# Patient Record
Sex: MALE | Race: WHITE | NOT HISPANIC OR LATINO | Employment: FULL TIME | URBAN - METROPOLITAN AREA
[De-identification: names, ages, dates, MRNs, and addresses within clinical notes are randomized per-mention and may not be internally consistent; named-entity substitution may affect disease eponyms.]

---

## 2017-04-06 ENCOUNTER — ALLSCRIPTS OFFICE VISIT (OUTPATIENT)
Dept: OTHER | Facility: OTHER | Age: 47
End: 2017-04-06

## 2017-04-06 DIAGNOSIS — R73.01 IMPAIRED FASTING GLUCOSE: ICD-10-CM

## 2017-04-06 DIAGNOSIS — E03.9 HYPOTHYROIDISM: ICD-10-CM

## 2017-04-06 DIAGNOSIS — Z83.3 FAMILY HISTORY OF DIABETES MELLITUS: ICD-10-CM

## 2017-08-24 ENCOUNTER — GENERIC CONVERSION - ENCOUNTER (OUTPATIENT)
Dept: OTHER | Facility: OTHER | Age: 47
End: 2017-08-24

## 2017-08-24 DIAGNOSIS — E78.5 HYPERLIPIDEMIA: ICD-10-CM

## 2017-12-08 ENCOUNTER — ALLSCRIPTS OFFICE VISIT (OUTPATIENT)
Dept: OTHER | Facility: OTHER | Age: 47
End: 2017-12-08

## 2017-12-08 DIAGNOSIS — E66.01 MORBID (SEVERE) OBESITY DUE TO EXCESS CALORIES (HCC): ICD-10-CM

## 2017-12-08 DIAGNOSIS — R73.01 IMPAIRED FASTING GLUCOSE: ICD-10-CM

## 2017-12-09 NOTE — PROGRESS NOTES
Assessment    1  Morbid obesity with body mass index of 40 0-44 9 in adult (278 01,V85 41) (E66 01,Z68 41)   2  Abnormal fasting glucose (790 29) (R73 01)   3  Benign essential hypertension (401 1) (I10)   4  Hyperlipemia (272 4) (E78 5)   5  Chronic pain syndrome (338 4) (G89 4)    Plan  Abnormal fasting glucose    · (1) COMPREHENSIVE METABOLIC PANEL; Status:Active; Requested for:[id number]; Abnormal fasting glucose, Benign essential hypertension, Chronic pain syndrome, Hyperlipemia    · Continue with our present treatment plan ; Status:Complete;   Done: 46FML6375  Benign essential hypertension    · Quinapril HCl - 20 MG Oral Tablet; take 1 tablet by mouth daily  Dyspepsia    · Esomeprazole Magnesium 40 MG Oral Capsule Delayed Release (NexIUM); TAKE 1CAPSULE Daily  Morbid obesity with body mass index of 40 0-44 9 in adult    · 1 - Jesus Jerome MD, David Grover  (General Surgery) Co-Management  *  Status: Active  Requested for:91Bgy1818  Care Summary provided  : Yes   · (1) LIPID PANEL, FASTING; Status:Active; Requested for:12Wsu6018;     Chief Complaint  Chief Complaint Chronic Condition [place] Distance: Patient is here today for follow up of chronic conditions described in HPI  History of Present Illness  Hyperlipidemia (Follow-Up): The patient states his hyperlipidemia has been stable since the last visit  Comorbid Illnesses: hypertension  He has no significant interval events  Symptoms: The patient is currently asymptomatic  stable muscle pain  Associated symptoms include no focal neurologic deficits-- and-- no memory loss  Medications: the patient is adherent with his medication regimen  -- He denies medication side effects  The patient is due for a lipid panel  Hypertension (Follow-Up): The patient presents for follow-up of essential hypertension  The patient states he has been doing well with his blood pressure control since the last visit  He has no significant interval events     Symptoms: The patient is currently asymptomatic  Associated symptoms include no headache,-- no focal neurologic deficits-- and-- no memory loss  Home monitoring: The patient is not checking blood pressure at home  Medications: the patient is adherent with his medication regimen  -- He denies medication side effects  Disease Management: the patient is doing well with his blood pressure goals  The patient is due for a lipid panel-- and-- a serum creatinine  Chronic Pain (Follow-Up): The patient is being seen for follow-up of chronic neck pain, chronic back pain and myofascial pain syndrome  The patient reports doing well and with slow tper  Comorbid Illnesses: depression  He has had no significant interval events  Associated symptoms: depression,-- anxiety,-- sleep disturbance-- and-- decreased libido  Medications:  the patient is adherent to his medication regimen, but-- he denies medication side effects  Disease management:  the patient is doing well with his goals  Review of Systems  Complete-Male:  Constitutional: recent weight loss, but-- as noted in HPI   ENT: no sore throat  Cardiovascular: no chest pain  Respiratory: shortness of breath during exertion, but-- no shortness of breath  Gastrointestinal: no abdominal pain,-- no nausea-- and-- no diarrhea  Musculoskeletal: arthralgias-- and-- myalgias  Neurological: no headache,-- no numbness-- and-- no dizziness  Psychiatric: anxiety,-- depression-- and-- controlled  Active Problems  1  Abnormal fasting glucose (790 29) (R73 01)   2  Anemia, unspecified anemia type   3  Benign essential hypertension (401 1) (I10)   4  Chronic pain syndrome (338 4) (G89 4)   5  Degenerative arthropathy (715 90) (M19 90)   6  Depression with anxiety (300 4) (F41 8)   7  Dyspepsia (536 8) (R10 13)   8  Encounter for blood test (V72 60) (Z01 89)   9  Encounter for chronic pain management (V65 49) (G89 29)   10  Encounter to establish care with new doctor (V65 8) (Z76 89)   11   Erectile dysfunction (607 84) (N52 9)   12  Flu vaccine need (V04 81) (Z23)   13  Hyperlipemia (272 4) (E78 5)   14  Hypothyroidism (244 9) (E03 9)   15  Impingement syndrome of left shoulder (726 2) (M75 42)   16  Low testosterone level in male (257 2) (E29 1)   17  Morbid obesity with body mass index of 40 0-44 9 in adult (278 01,V85 41) (E66 01,Z68 41)   18  Neck pain (723 1) (M54 2)   19  Osteoarthritis (715 90) (M19 90)   20  Pain management contract signed (N40 48) (Z02 89)   21  Preop exam for internal medicine (V72 83) (Z01 818)   22  Primary hypothyroidism (244 9) (E03 9)   23  Prostate cancer screening (V76 44) (Z12 5)   24  Syndrome X, metabolic (181 5) (D84 26)   25  Vitamin D deficiency (268 9) (E55 9)    Past Medical History  1  History of Benign essential hypertension (401 1) (I10)  Active Problems And Past Medical History Reviewed: The active problems and past medical history were reviewed and updated today  Surgical History  1  History of Knee Replacement   2  History of Shoulder Surgery   3  History of Total Disc Arthroplasty  Surgical History Reviewed: The surgical history was reviewed and updated today  Family History  Father    1  Family history of diabetes mellitus (V18 0) (Z83 3)  Family History Reviewed: The family history was reviewed and updated today  Social History     ·    · Never a smoker   · No alcohol use   · Non-smoker (V49 89) (Z78 9)   · Two children  Social History Reviewed: The social history was reviewed and updated today  Current Meds   1  ALPRAZolam 0 5 MG Oral Tablet; TAKE 1 TABLET TWICE DAILY AS DIRECTED; Last Rx:02Nov2017 Ordered   2  Citalopram Hydrobromide 20 MG Oral Tablet; TAKE 1 TABLET DAILY; Therapy: 19CUM3130 to (Barron Lua)  Requested for: 38NLZ1309; Last Rx:49Ogq0512 Ordered   3  Crestor 10 MG Oral Tablet; TAKE 1 TABLET DAILY; Therapy: 68Syd3541 to (Evaluate:23Woo5901)  Requested for: 92Bau2869; Last Rx:99Fub9563 Ordered   4  FentaNYL 50 MCG/HR Transdermal Patch 72 Hour; 1 patch every 72hrs; Therapy: 32HUY3410 to (Evaluate:56Hdj3938); Last Rx:30Nov2017 Ordered   5  FentaNYL 50 MCG/HR Transdermal Patch 72 Hour; APPLY 1 PATCH EVERY 3 DAYS; Therapy: 34GEH4867 to (Evaluate:04Hne6953); Last Rx:30Nov2017 Ordered   6  Lansoprazole 30 MG Oral Capsule Delayed Release; take 1 capsule daily; Therapy: 46IVO5025 to (Last Jacintoladonna Tavera)  Requested for: 18Ajw6900 Ordered   7  Levothyroxine Sodium 75 MCG Oral Tablet; TAKE 1 TABLET DAILY  Requested for: 78PYV3299; Last Rx:09Fku0224 Ordered   8  MetFORMIN HCl - 500 MG Oral Tablet; take 1 tablet by mouth twice a day with food; Therapy: 27BCO6109 to (Evaluate:20Jan2018)  Requested for: 23Uoa8483; Last Rx:53Yhf2894 Ordered   9  OxyCODONE HCl - 20 MG Oral Tablet; one  or two pill PO q 4 hr prn; Last Rx:20Nov2017 Ordered   10  Prevacid 30 MG Oral Capsule Delayed Release; Therapy: (Trude Lama) to Recorded   11  Quinapril HCl - 20 MG Oral Tablet; take 1 tablet by mouth daily; Therapy: 45LZI8680 to [phone])  Requested for: 05PUZ7114; Last Rx:22Ovx3393  Ordered   12  Vitamin D3 53609 UNIT Oral Capsule; TAKE ONE CAPSULE BY MOUTH WEEKLY FOR 2 MONTHS; Therapy: 37JPX5727 to [phone])  Requested for: 73Asf7978; Last Rx:56Exo1305  Ordered  Medication List Reviewed: The medication list was reviewed and updated today  Allergies  1  No Known Drug Allergies    Vitals  Vital Signs    Recorded: 04NLE1546 09:56AM   Heart Rate 104   Height 5 ft 9 in   Weight 338 lb    BMI Calculated 49 91   BSA Calculated 2 58   O2 Saturation 94       Physical Exam   Constitutional  General appearance: Abnormal  -- morbid obese  Ears, Nose, Mouth, and Throat  Oropharynx: Abnormal  -- Crowded pharynx  Pulmonary  Respiratory effort: No increased work of breathing or signs of respiratory distress     Auscultation of lungs: Clear to auscultation, equal breath sounds bilaterally, no wheezes, no rales, no rhonci  Cardiovascular  Auscultation of heart: Normal rate and rhythm, normal S1 and S2, without murmurs  Examination of extremities for edema and/or varicosities: Normal    Carotid pulses: Normal    Abdomen  Abdomen: Abnormal  -- obese, NT, BS+  Lymphatic  Palpation of lymph nodes in neck: No lymphadenopathy  Musculoskeletal  Gait and station: Normal    Neurologic  Cranial nerves: Cranial nerves 2-12 intact     Psychiatric  Orientation to person, place and time: Normal    Mood and affect: Normal          Signatures   Electronically signed by : EDWAR Vogt ; Dec  8 2017  5:18PM EST                       (Author)

## 2018-01-13 VITALS
HEIGHT: 69 IN | DIASTOLIC BLOOD PRESSURE: 84 MMHG | OXYGEN SATURATION: 96 % | SYSTOLIC BLOOD PRESSURE: 112 MMHG | HEART RATE: 96 BPM | BODY MASS INDEX: 46.65 KG/M2 | WEIGHT: 315 LBS

## 2018-01-22 VITALS
OXYGEN SATURATION: 94 % | HEIGHT: 69 IN | HEART RATE: 108 BPM | BODY MASS INDEX: 46.65 KG/M2 | DIASTOLIC BLOOD PRESSURE: 82 MMHG | WEIGHT: 315 LBS | SYSTOLIC BLOOD PRESSURE: 132 MMHG

## 2018-01-22 VITALS — WEIGHT: 315 LBS | HEART RATE: 104 BPM | OXYGEN SATURATION: 94 % | BODY MASS INDEX: 46.65 KG/M2 | HEIGHT: 69 IN

## 2018-02-12 DIAGNOSIS — R52 PAIN: Primary | ICD-10-CM

## 2018-02-12 RX ORDER — FENTANYL 50 UG/H
1 PATCH TRANSDERMAL
Qty: 15 PATCH | Refills: 0 | Status: SHIPPED | OUTPATIENT
Start: 2018-02-12 | End: 2018-03-08 | Stop reason: SDUPTHER

## 2018-02-12 RX ORDER — FENTANYL 50 UG/H
1 PATCH TRANSDERMAL
Qty: 1 PATCH | Refills: 0 | Status: SHIPPED | OUTPATIENT
Start: 2018-02-12 | End: 2018-03-08 | Stop reason: SDUPTHER

## 2018-02-12 RX ORDER — OXYCODONE HYDROCHLORIDE 20 MG/1
20 TABLET ORAL EVERY 4 HOURS PRN
Qty: 360 TABLET | Refills: 0 | Status: SHIPPED | OUTPATIENT
Start: 2018-02-12 | End: 2018-03-08 | Stop reason: SDUPTHER

## 2018-02-12 RX ORDER — ALPRAZOLAM 0.5 MG/1
1 TABLET ORAL 2 TIMES DAILY
COMMUNITY
End: 2018-02-12 | Stop reason: SDUPTHER

## 2018-02-12 RX ORDER — OXYCODONE HYDROCHLORIDE 20 MG/1
TABLET ORAL
COMMUNITY
End: 2018-02-12 | Stop reason: SDUPTHER

## 2018-02-12 RX ORDER — FENTANYL 50 UG/H
PATCH TRANSDERMAL
COMMUNITY
Start: 2016-11-04 | End: 2018-02-12 | Stop reason: SDUPTHER

## 2018-02-12 RX ORDER — ALPRAZOLAM 0.5 MG/1
0.5 TABLET ORAL 2 TIMES DAILY
Qty: 180 TABLET | Refills: 0 | Status: SHIPPED | OUTPATIENT
Start: 2018-02-12 | End: 2018-07-18 | Stop reason: SDUPTHER

## 2018-02-12 RX ORDER — FENTANYL 50 UG/H
1 PATCH TRANSDERMAL
COMMUNITY
Start: 2016-11-04 | End: 2018-02-12 | Stop reason: SDUPTHER

## 2018-03-08 DIAGNOSIS — R52 PAIN: ICD-10-CM

## 2018-03-08 RX ORDER — OXYCODONE HYDROCHLORIDE 20 MG/1
20 TABLET ORAL EVERY 4 HOURS PRN
Qty: 240 TABLET | Refills: 0 | Status: SHIPPED | OUTPATIENT
Start: 2018-03-08 | End: 2018-04-02 | Stop reason: SDUPTHER

## 2018-03-08 RX ORDER — OXYCODONE HYDROCHLORIDE 20 MG/1
20 TABLET ORAL EVERY 4 HOURS PRN
Qty: 120 TABLET | Refills: 0 | Status: SHIPPED | OUTPATIENT
Start: 2018-03-08 | End: 2018-04-02 | Stop reason: SDUPTHER

## 2018-03-08 RX ORDER — FENTANYL 50 UG/H
1 PATCH TRANSDERMAL
Qty: 1 PATCH | Refills: 0 | Status: SHIPPED | OUTPATIENT
Start: 2018-03-08 | End: 2018-04-19 | Stop reason: SDUPTHER

## 2018-03-08 RX ORDER — FENTANYL 50 UG/H
1 PATCH TRANSDERMAL
Qty: 15 PATCH | Refills: 0 | Status: SHIPPED | OUTPATIENT
Start: 2018-03-08 | End: 2018-04-19 | Stop reason: SDUPTHER

## 2018-03-08 NOTE — TELEPHONE ENCOUNTER
Patients insurance will only pay for 240 tablets of oxycodone a month so he needs to pay for 120 tablets out of pocket  This is why we need two separate scripts  The two scripts will total 360 which still amounts to the same amount patient takes monthly

## 2018-04-02 DIAGNOSIS — R52 PAIN: ICD-10-CM

## 2018-04-03 RX ORDER — OXYCODONE HYDROCHLORIDE 20 MG/1
20 TABLET ORAL EVERY 4 HOURS PRN
Qty: 120 TABLET | Refills: 0 | Status: SHIPPED | OUTPATIENT
Start: 2018-04-03 | End: 2018-04-09 | Stop reason: SDUPTHER

## 2018-04-03 RX ORDER — OXYCODONE HYDROCHLORIDE 20 MG/1
20 TABLET ORAL EVERY 4 HOURS PRN
Qty: 240 TABLET | Refills: 0 | Status: SHIPPED | OUTPATIENT
Start: 2018-04-03 | End: 2018-05-01 | Stop reason: SDUPTHER

## 2018-04-09 DIAGNOSIS — R52 PAIN: ICD-10-CM

## 2018-04-09 RX ORDER — OXYCODONE HYDROCHLORIDE 20 MG/1
TABLET ORAL
Qty: 120 TABLET | Refills: 0 | Status: SHIPPED | OUTPATIENT
Start: 2018-04-09 | End: 2018-05-01 | Stop reason: SDUPTHER

## 2018-04-09 NOTE — TELEPHONE ENCOUNTER
The pharmacy is requesting the script to change to 1 to 2 tablets every 4hrs for the 120 tab request of oxycodone  Pt will return the original script before we hand him the new script

## 2018-04-14 DIAGNOSIS — F32.A DEPRESSION, UNSPECIFIED DEPRESSION TYPE: Primary | ICD-10-CM

## 2018-04-16 RX ORDER — CITALOPRAM 20 MG/1
TABLET ORAL
Qty: 90 TABLET | Refills: 0 | Status: SHIPPED | OUTPATIENT
Start: 2018-04-16 | End: 2018-07-21 | Stop reason: SDUPTHER

## 2018-04-19 DIAGNOSIS — R52 PAIN: ICD-10-CM

## 2018-04-19 RX ORDER — FENTANYL 50 UG/H
1 PATCH TRANSDERMAL
Qty: 15 PATCH | Refills: 0 | Status: SHIPPED | OUTPATIENT
Start: 2018-04-19 | End: 2018-05-17 | Stop reason: SDUPTHER

## 2018-04-19 RX ORDER — FENTANYL 50 UG/H
1 PATCH TRANSDERMAL
Qty: 1 PATCH | Refills: 0 | Status: SHIPPED | OUTPATIENT
Start: 2018-04-19 | End: 2018-05-17 | Stop reason: SDUPTHER

## 2018-05-01 DIAGNOSIS — R52 PAIN: ICD-10-CM

## 2018-05-01 DIAGNOSIS — I15.9 SECONDARY HYPERTENSION: Primary | ICD-10-CM

## 2018-05-01 RX ORDER — QUINAPRIL 20 MG/1
20 TABLET ORAL DAILY
Qty: 90 TABLET | Refills: 1 | Status: SHIPPED | OUTPATIENT
Start: 2018-05-01 | End: 2018-10-21 | Stop reason: SDUPTHER

## 2018-05-01 RX ORDER — QUINAPRIL 20 MG/1
1 TABLET ORAL DAILY
COMMUNITY
Start: 2017-04-06 | End: 2018-05-01 | Stop reason: SDUPTHER

## 2018-05-01 RX ORDER — OXYCODONE HYDROCHLORIDE 20 MG/1
20 TABLET ORAL EVERY 4 HOURS PRN
Qty: 240 TABLET | Refills: 0 | Status: SHIPPED | OUTPATIENT
Start: 2018-05-01 | End: 2018-05-30 | Stop reason: SDUPTHER

## 2018-05-01 RX ORDER — OXYCODONE HYDROCHLORIDE 20 MG/1
TABLET ORAL
Qty: 120 TABLET | Refills: 0 | Status: SHIPPED | OUTPATIENT
Start: 2018-05-01 | End: 2018-05-30 | Stop reason: SDUPTHER

## 2018-05-17 DIAGNOSIS — R52 PAIN: ICD-10-CM

## 2018-05-17 RX ORDER — FENTANYL 50 UG/H
1 PATCH TRANSDERMAL
Qty: 1 PATCH | Refills: 0 | Status: SHIPPED | OUTPATIENT
Start: 2018-05-17 | End: 2018-06-18 | Stop reason: SDUPTHER

## 2018-05-17 RX ORDER — FENTANYL 50 UG/H
1 PATCH TRANSDERMAL
Qty: 15 PATCH | Refills: 0 | Status: SHIPPED | OUTPATIENT
Start: 2018-05-17 | End: 2018-06-18 | Stop reason: SDUPTHER

## 2018-05-30 DIAGNOSIS — R52 PAIN: ICD-10-CM

## 2018-05-30 RX ORDER — OXYCODONE HYDROCHLORIDE 20 MG/1
TABLET ORAL
Qty: 120 TABLET | Refills: 0 | Status: SHIPPED | OUTPATIENT
Start: 2018-05-30 | End: 2018-06-25 | Stop reason: SDUPTHER

## 2018-05-30 RX ORDER — OXYCODONE HYDROCHLORIDE 20 MG/1
20 TABLET ORAL EVERY 4 HOURS PRN
Qty: 240 TABLET | Refills: 0 | Status: SHIPPED | OUTPATIENT
Start: 2018-05-30 | End: 2018-06-25 | Stop reason: SDUPTHER

## 2018-06-18 DIAGNOSIS — R52 PAIN: ICD-10-CM

## 2018-06-18 RX ORDER — FENTANYL 50 UG/H
1 PATCH TRANSDERMAL
Qty: 1 PATCH | Refills: 0 | Status: SHIPPED | OUTPATIENT
Start: 2018-06-18 | End: 2018-07-16 | Stop reason: SDUPTHER

## 2018-06-18 RX ORDER — FENTANYL 50 UG/H
1 PATCH TRANSDERMAL
Qty: 15 PATCH | Refills: 0 | Status: SHIPPED | OUTPATIENT
Start: 2018-06-18 | End: 2018-07-16 | Stop reason: SDUPTHER

## 2018-06-24 DIAGNOSIS — E03.9 HYPOTHYROIDISM, UNSPECIFIED TYPE: Primary | ICD-10-CM

## 2018-06-25 DIAGNOSIS — R52 PAIN: ICD-10-CM

## 2018-06-25 RX ORDER — OXYCODONE HYDROCHLORIDE 20 MG/1
20 TABLET ORAL EVERY 4 HOURS PRN
Qty: 240 TABLET | Refills: 0 | Status: SHIPPED | OUTPATIENT
Start: 2018-06-25 | End: 2018-07-25 | Stop reason: SDUPTHER

## 2018-06-25 RX ORDER — LEVOTHYROXINE SODIUM 0.07 MG/1
TABLET ORAL
Qty: 90 TABLET | Refills: 3 | Status: SHIPPED | OUTPATIENT
Start: 2018-06-25 | End: 2018-08-23 | Stop reason: SDUPTHER

## 2018-06-25 RX ORDER — OXYCODONE HYDROCHLORIDE 20 MG/1
TABLET ORAL
Qty: 120 TABLET | Refills: 0 | Status: SHIPPED | OUTPATIENT
Start: 2018-06-25 | End: 2018-07-25 | Stop reason: SDUPTHER

## 2018-06-29 ENCOUNTER — TELEPHONE (OUTPATIENT)
Dept: INTERNAL MEDICINE CLINIC | Facility: CLINIC | Age: 48
End: 2018-06-29

## 2018-06-29 NOTE — TELEPHONE ENCOUNTER
Sarabjit Barbour from [facility] called with questions on an oxycodone script that was sent to the pharmacy  Please call him back at [phone]

## 2018-07-02 NOTE — TELEPHONE ENCOUNTER
Spoke to the pharmacist M[facility] at [facility] he stated that patient had two oxycodone scripts one that states that he should not be taking more than six a day, but patient states that he takes 12 tablets daily  Patient could not fill the script with the limit on it so he filled his other script that had no limit on it  Please advise what patient should be taking daily   Thanks

## 2018-07-16 ENCOUNTER — TELEPHONE (OUTPATIENT)
Dept: INTERNAL MEDICINE CLINIC | Facility: CLINIC | Age: 48
End: 2018-07-16

## 2018-07-16 DIAGNOSIS — R52 PAIN: ICD-10-CM

## 2018-07-16 RX ORDER — FENTANYL 50 UG/H
1 PATCH TRANSDERMAL
Qty: 1 PATCH | Refills: 0 | Status: SHIPPED | OUTPATIENT
Start: 2018-07-16 | End: 2018-08-09 | Stop reason: SDUPTHER

## 2018-07-16 RX ORDER — FENTANYL 50 UG/H
1 PATCH TRANSDERMAL
Qty: 10 PATCH | Refills: 0 | Status: SHIPPED | OUTPATIENT
Start: 2018-07-16 | End: 2018-08-09 | Stop reason: SDUPTHER

## 2018-07-16 NOTE — TELEPHONE ENCOUNTER
Patient left message on the nurses line stating that he needs refills on his Fentanyl patches  He needs one single script and another one for 10 patches every two days

## 2018-07-16 NOTE — TELEPHONE ENCOUNTER
Danni Garcia I printed it the way he was taking it  I reviewed the drug database (we have to do this for everyone so if someone calls, let's look and see when it was last filled)   Last filled 6/22/18

## 2018-07-18 DIAGNOSIS — R52 PAIN: ICD-10-CM

## 2018-07-20 DIAGNOSIS — R52 PAIN: ICD-10-CM

## 2018-07-21 DIAGNOSIS — F32.A DEPRESSION, UNSPECIFIED DEPRESSION TYPE: ICD-10-CM

## 2018-07-23 RX ORDER — CITALOPRAM 20 MG/1
TABLET ORAL
Qty: 90 TABLET | Refills: 0 | Status: SHIPPED | OUTPATIENT
Start: 2018-07-23 | End: 2018-10-22 | Stop reason: SDUPTHER

## 2018-07-24 ENCOUNTER — TELEPHONE (OUTPATIENT)
Dept: INTERNAL MEDICINE CLINIC | Facility: CLINIC | Age: 48
End: 2018-07-24

## 2018-07-24 NOTE — TELEPHONE ENCOUNTER
Patient called stating that he needs the following scripts for   -Oxycodone 20 mg take one or two tablets every four hours w/qty 240  -Oxycodone 20 mg take one or two tablets every four hours with a max of 12 tablets a day qty 120  -xanax 0 5 mg

## 2018-07-25 DIAGNOSIS — R52 PAIN: ICD-10-CM

## 2018-07-25 RX ORDER — ALPRAZOLAM 0.5 MG/1
0.5 TABLET ORAL 2 TIMES DAILY
Qty: 180 TABLET | Refills: 0 | OUTPATIENT
Start: 2018-07-25 | End: 2018-08-09 | Stop reason: SDUPTHER

## 2018-07-25 RX ORDER — OXYCODONE HYDROCHLORIDE 20 MG/1
20 TABLET ORAL EVERY 4 HOURS PRN
Qty: 240 TABLET | Refills: 0 | Status: SHIPPED | OUTPATIENT
Start: 2018-07-25 | End: 2018-08-23 | Stop reason: SDUPTHER

## 2018-07-25 RX ORDER — ALPRAZOLAM 0.5 MG/1
TABLET ORAL
Qty: 180 TABLET | Refills: 0 | Status: SHIPPED | OUTPATIENT
Start: 2018-07-25 | End: 2018-08-09 | Stop reason: SDUPTHER

## 2018-08-09 ENCOUNTER — APPOINTMENT (OUTPATIENT)
Dept: LAB | Facility: CLINIC | Age: 48
End: 2018-08-09
Payer: COMMERCIAL

## 2018-08-09 ENCOUNTER — OFFICE VISIT (OUTPATIENT)
Dept: INTERNAL MEDICINE CLINIC | Facility: CLINIC | Age: 48
End: 2018-08-09
Payer: COMMERCIAL

## 2018-08-09 VITALS
BODY MASS INDEX: 46.65 KG/M2 | HEIGHT: 69 IN | DIASTOLIC BLOOD PRESSURE: 80 MMHG | WEIGHT: 315 LBS | OXYGEN SATURATION: 92 % | SYSTOLIC BLOOD PRESSURE: 130 MMHG | HEART RATE: 110 BPM | RESPIRATION RATE: 20 BRPM

## 2018-08-09 DIAGNOSIS — M15.8 OTHER OSTEOARTHRITIS INVOLVING MULTIPLE JOINTS: ICD-10-CM

## 2018-08-09 DIAGNOSIS — Z79.4 TYPE 2 DIABETES MELLITUS WITHOUT COMPLICATION, WITH LONG-TERM CURRENT USE OF INSULIN (HCC): Primary | ICD-10-CM

## 2018-08-09 DIAGNOSIS — E66.01 MORBID OBESITY WITH BMI OF 50.0-59.9, ADULT (HCC): Primary | ICD-10-CM

## 2018-08-09 DIAGNOSIS — I10 BENIGN ESSENTIAL HYPERTENSION: ICD-10-CM

## 2018-08-09 DIAGNOSIS — G89.4 CHRONIC PAIN SYNDROME: ICD-10-CM

## 2018-08-09 DIAGNOSIS — E11.9 TYPE 2 DIABETES MELLITUS WITHOUT COMPLICATION, WITH LONG-TERM CURRENT USE OF INSULIN (HCC): Primary | ICD-10-CM

## 2018-08-09 DIAGNOSIS — E11.9 TYPE 2 DIABETES MELLITUS WITHOUT COMPLICATION, WITHOUT LONG-TERM CURRENT USE OF INSULIN (HCC): ICD-10-CM

## 2018-08-09 DIAGNOSIS — R52 PAIN: ICD-10-CM

## 2018-08-09 PROBLEM — E78.5 HYPERLIPEMIA: Status: ACTIVE | Noted: 2017-08-24

## 2018-08-09 PROBLEM — E55.9 VITAMIN D DEFICIENCY: Status: ACTIVE | Noted: 2017-04-06

## 2018-08-09 LAB
25(OH)D3 SERPL-MCNC: 12.8 NG/ML (ref 30–100)
ALBUMIN SERPL BCP-MCNC: 3.7 G/DL (ref 3.5–5)
ALP SERPL-CCNC: 97 U/L (ref 46–116)
ALT SERPL W P-5'-P-CCNC: 34 U/L (ref 12–78)
ANION GAP SERPL CALCULATED.3IONS-SCNC: 7 MMOL/L (ref 4–13)
AST SERPL W P-5'-P-CCNC: 14 U/L (ref 5–45)
BASOPHILS # BLD AUTO: 0.05 THOUSANDS/ΜL (ref 0–0.1)
BASOPHILS NFR BLD AUTO: 1 % (ref 0–1)
BILIRUB SERPL-MCNC: 0.46 MG/DL (ref 0.2–1)
BUN SERPL-MCNC: 14 MG/DL (ref 5–25)
CALCIUM SERPL-MCNC: 8.9 MG/DL (ref 8.3–10.1)
CHLORIDE SERPL-SCNC: 95 MMOL/L (ref 100–108)
CHOLEST SERPL-MCNC: 232 MG/DL (ref 50–200)
CO2 SERPL-SCNC: 28 MMOL/L (ref 21–32)
CREAT SERPL-MCNC: 1.4 MG/DL (ref 0.6–1.3)
CREAT UR-MCNC: 67.4 MG/DL
EOSINOPHIL # BLD AUTO: 0.17 THOUSAND/ΜL (ref 0–0.61)
EOSINOPHIL NFR BLD AUTO: 2 % (ref 0–6)
ERYTHROCYTE [DISTWIDTH] IN BLOOD BY AUTOMATED COUNT: 12.8 % (ref 11.6–15.1)
EST. AVERAGE GLUCOSE BLD GHB EST-MCNC: 240 MG/DL
GFR SERPL CREATININE-BSD FRML MDRD: 59 ML/MIN/1.73SQ M
GLUCOSE P FAST SERPL-MCNC: 597 MG/DL (ref 65–99)
HBA1C MFR BLD: 10 % (ref 4.2–6.3)
HCT VFR BLD AUTO: 42.6 % (ref 36.5–49.3)
HDLC SERPL-MCNC: 30 MG/DL (ref 40–60)
HGB BLD-MCNC: 14.1 G/DL (ref 12–17)
IMM GRANULOCYTES # BLD AUTO: 0.09 THOUSAND/UL (ref 0–0.2)
IMM GRANULOCYTES NFR BLD AUTO: 1 % (ref 0–2)
LYMPHOCYTES # BLD AUTO: 1.76 THOUSANDS/ΜL (ref 0.6–4.47)
LYMPHOCYTES NFR BLD AUTO: 19 % (ref 14–44)
MCH RBC QN AUTO: 32 PG (ref 26.8–34.3)
MCHC RBC AUTO-ENTMCNC: 33.1 G/DL (ref 31.4–37.4)
MCV RBC AUTO: 97 FL (ref 82–98)
MICROALBUMIN UR-MCNC: 32.6 MG/L (ref 0–20)
MICROALBUMIN/CREAT 24H UR: 48 MG/G CREATININE (ref 0–30)
MONOCYTES # BLD AUTO: 0.61 THOUSAND/ΜL (ref 0.17–1.22)
MONOCYTES NFR BLD AUTO: 7 % (ref 4–12)
NEUTROPHILS # BLD AUTO: 6.54 THOUSANDS/ΜL (ref 1.85–7.62)
NEUTS SEG NFR BLD AUTO: 70 % (ref 43–75)
NONHDLC SERPL-MCNC: 202 MG/DL
NRBC BLD AUTO-RTO: 0 /100 WBCS
PLATELET # BLD AUTO: 265 THOUSANDS/UL (ref 149–390)
PMV BLD AUTO: 11.4 FL (ref 8.9–12.7)
POTASSIUM SERPL-SCNC: 3.9 MMOL/L (ref 3.5–5.3)
PROT SERPL-MCNC: 7.8 G/DL (ref 6.4–8.2)
RBC # BLD AUTO: 4.41 MILLION/UL (ref 3.88–5.62)
SL AMB POCT GLUCOSE BLD: 530
SODIUM SERPL-SCNC: 130 MMOL/L (ref 136–145)
TRIGL SERPL-MCNC: 636 MG/DL
TSH SERPL DL<=0.05 MIU/L-ACNC: 1.62 UIU/ML (ref 0.36–3.74)
WBC # BLD AUTO: 9.22 THOUSAND/UL (ref 4.31–10.16)

## 2018-08-09 PROCEDURE — 3075F SYST BP GE 130 - 139MM HG: CPT | Performed by: INTERNAL MEDICINE

## 2018-08-09 PROCEDURE — 80061 LIPID PANEL: CPT | Performed by: INTERNAL MEDICINE

## 2018-08-09 PROCEDURE — 83036 HEMOGLOBIN GLYCOSYLATED A1C: CPT | Performed by: INTERNAL MEDICINE

## 2018-08-09 PROCEDURE — 82043 UR ALBUMIN QUANTITATIVE: CPT | Performed by: INTERNAL MEDICINE

## 2018-08-09 PROCEDURE — 84443 ASSAY THYROID STIM HORMONE: CPT | Performed by: INTERNAL MEDICINE

## 2018-08-09 PROCEDURE — 82570 ASSAY OF URINE CREATININE: CPT | Performed by: INTERNAL MEDICINE

## 2018-08-09 PROCEDURE — 3079F DIAST BP 80-89 MM HG: CPT | Performed by: INTERNAL MEDICINE

## 2018-08-09 PROCEDURE — 3060F POS MICROALBUMINURIA REV: CPT | Performed by: INTERNAL MEDICINE

## 2018-08-09 PROCEDURE — 85025 COMPLETE CBC W/AUTO DIFF WBC: CPT | Performed by: INTERNAL MEDICINE

## 2018-08-09 PROCEDURE — 82306 VITAMIN D 25 HYDROXY: CPT | Performed by: INTERNAL MEDICINE

## 2018-08-09 PROCEDURE — 36415 COLL VENOUS BLD VENIPUNCTURE: CPT | Performed by: INTERNAL MEDICINE

## 2018-08-09 PROCEDURE — 80053 COMPREHEN METABOLIC PANEL: CPT | Performed by: INTERNAL MEDICINE

## 2018-08-09 PROCEDURE — 99215 OFFICE O/P EST HI 40 MIN: CPT | Performed by: INTERNAL MEDICINE

## 2018-08-09 PROCEDURE — 82948 REAGENT STRIP/BLOOD GLUCOSE: CPT | Performed by: INTERNAL MEDICINE

## 2018-08-09 RX ORDER — ALPRAZOLAM 0.5 MG/1
0.5 TABLET ORAL 2 TIMES DAILY
Qty: 180 TABLET | Refills: 0 | OUTPATIENT
Start: 2018-08-09 | End: 2018-08-09 | Stop reason: SDUPTHER

## 2018-08-09 RX ORDER — ROSUVASTATIN CALCIUM 10 MG/1
10 TABLET, COATED ORAL DAILY
Refills: 3 | COMMUNITY
Start: 2018-06-21 | End: 2018-09-16 | Stop reason: SDUPTHER

## 2018-08-09 RX ORDER — ESOMEPRAZOLE MAGNESIUM 40 MG/1
40 CAPSULE, DELAYED RELEASE ORAL DAILY
Refills: 3 | COMMUNITY
Start: 2018-06-21 | End: 2018-11-26 | Stop reason: ALTCHOICE

## 2018-08-09 RX ORDER — FENTANYL 50 UG/H
1 PATCH TRANSDERMAL
Qty: 1 PATCH | Refills: 0 | Status: SHIPPED | OUTPATIENT
Start: 2018-08-09 | End: 2018-09-10 | Stop reason: SDUPTHER

## 2018-08-09 RX ORDER — FENTANYL 50 UG/H
1 PATCH TRANSDERMAL
Qty: 10 PATCH | Refills: 0 | Status: SHIPPED | OUTPATIENT
Start: 2018-08-09 | End: 2018-09-10 | Stop reason: SDUPTHER

## 2018-08-09 RX ORDER — ALPRAZOLAM 0.5 MG/1
0.5 TABLET ORAL 2 TIMES DAILY
Qty: 180 TABLET | Refills: 0 | Status: SHIPPED | OUTPATIENT
Start: 2018-08-09 | End: 2018-11-05 | Stop reason: SDUPTHER

## 2018-08-09 RX ORDER — LANSOPRAZOLE 30 MG/1
1 CAPSULE, DELAYED RELEASE ORAL DAILY
COMMUNITY
Start: 2016-11-16 | End: 2018-08-09 | Stop reason: ALTCHOICE

## 2018-08-09 NOTE — PROGRESS NOTES
Assessment/Plan:         Diagnoses and all orders for this visit:    Morbid obesity with BMI of 50 0-59 9, adult [facility])  -     Ambulatory referral to Bariatric Surgery; Future  -     Comprehensive metabolic panel  -     CBC and differential  -     TSH, 3rd generation with Free T4 reflex  -     Lipid panel  -     Vitamin D 25 hydroxy    Chronic pain syndrome  -     Ambulatory referral to Pain Management; Future  -     fentaNYL (DURAGESIC) 50 mcg/hr; Place 1 patch on the skin every third day Max Daily Amount: 1 patch  -     Pain Management Profile 1 W/ Confirmation, Urine    Type 2 diabetes mellitus without complication, without long-term current use of insulin ([facility])  -     Hemoglobin A1C  -     Microalbumin / creatinine urine ratio  -     POCT blood glucose  -     insulin glargine (BASAGLAR KWIKPEN) 100 units/mL injection pen; Inject 10 Units under the skin daily  His blood sugar in the office was 500  Patient would increase his fluid intake  Close follow-up as above Lab studies ordered above  Pain  -     fentaNYL (DURAGESIC) 50 mcg/hr; Place 1 patch on the skin every third day Max Daily Amount: 1 patch  -     Discontinue: ALPRAZolam (XANAX) 0 5 mg tablet; Take 1 tablet (0 5 mg total) by mouth 2 (two) times a day  -     Discontinue: ALPRAZolam (XANAX) 0 5 mg tablet; Take 1 tablet (0 5 mg total) by mouth 2 (two) times a day  -     Discontinue: ALPRAZolam (XANAX) 0 5 mg tablet; Take 1 tablet (0 5 mg total) by mouth 2 (two) times a day  -     ALPRAZolam (XANAX) 0 5 mg tablet; Take 1 tablet (0 5 mg total) by mouth 2 (two) times a day  -     Pain Management Profile 1 W/ Confirmation, Urine    Other osteoarthritis involving multiple joints  See discussion above  Benign essential hypertension  Control continue current regimen  Other orders  -     esomeprazole (NexIUM) 40 MG capsule; Take 40 mg by mouth daily  -     Discontinue: lansoprazole (PREVACID) 30 mg capsule;  Take 1 capsule by mouth daily  -     metFORMIN (GLUCOPHAGE) 500 mg tablet; Take 1 tablet by mouth Twice daily  -     rosuvastatin (CRESTOR) 10 MG tablet; Take 10 mg by mouth daily        Subjective:      Patient ID: María Elena Spencer is a 49 y o  male  Patient is here to follow-up on chronic medical problems and current narcotic use  We have discussed cutting back on the medication  Patient reports having ongoing arthralgias and myalgias  Patient has several joint replacements  He had seen a rheumatologist in the past   No other explanation of his arthralgias were found  We discussed revisit in but also following up with pain management  Urine tox was collected from the office today  He also reports his blood sugars running high  Fortunately has been only taking metformin 500 mg once a day  See ROS below  We discussed treatment options  I advised him to take metformin 500 twice a day  Start him on glargine 10 units a day  I would follow up with him in 2 weeks  Hopefully be able to use oral hypoglycemic medications have better glycemic profile  Patient reports no shortness of breath lightheadedness palpitation  He has not gone for lab studies ordered last time  Lab studies ordered today  Bariatric surgery was also discussed  The following portions of the patient's history were reviewed and updated as appropriate: allergies, current medications, past family history, past medical history, past social history, past surgical history and problem list     Review of Systems   Constitutional: Negative for chills and fever  HENT: Positive for congestion and postnasal drip  Negative for trouble swallowing  Eyes: Positive for visual disturbance  Respiratory: Negative for cough and shortness of breath  Cardiovascular: Negative for chest pain and leg swelling  Gastrointestinal: Negative for abdominal pain, blood in stool, constipation, diarrhea and nausea  Endocrine: Negative for polydipsia and polyphagia          Blood sugar at home has been running high   Genitourinary: Positive for frequency  Negative for dysuria  Musculoskeletal: Positive for arthralgias, back pain and myalgias  Neurological: Negative for dizziness and light-headedness  Psychiatric/Behavioral: Negative for dysphoric mood  Current Outpatient Prescriptions:     ALPRAZolam (XANAX) 0 5 mg tablet, Take 1 tablet (0 5 mg total) by mouth 2 (two) times a day, Disp: 180 tablet, Rfl: 0    citalopram (CeleXA) 20 mg tablet, TAKE 1 TABLET BY MOUTH EVERY DAY, Disp: 90 tablet, Rfl: 0    esomeprazole (NexIUM) 40 MG capsule, Take 40 mg by mouth daily, Disp: , Rfl: 3    fentaNYL (DURAGESIC) 50 mcg/hr, Place 1 patch on the skin every third day Max Daily Amount: 1 patch, Disp: 10 patch, Rfl: 0    levothyroxine 75 mcg tablet, TAKE 1 TABLET BY MOUTH EVERY DAY, Disp: 90 tablet, Rfl: 3    metFORMIN (GLUCOPHAGE) 500 mg tablet, Take 1 tablet by mouth Twice daily, Disp: , Rfl:     oxyCODONE (ROXICODONE) 20 MG TABS, Take 1 tablet (20 mg total) by mouth every 4 (four) hours as needed for moderate pain Pt may take 1 to 2 tablets every 4hrs as needed Max Daily Amount: 120 mg, Disp: 240 tablet, Rfl: 0    quinapril (ACCUPRIL) 20 mg tablet, Take 1 tablet (20 mg total) by mouth daily, Disp: 90 tablet, Rfl: 1    rosuvastatin (CRESTOR) 10 MG tablet, Take 10 mg by mouth daily, Disp: , Rfl: 3    fentaNYL (DURAGESIC) 50 mcg/hr, Place 1 patch on the skin every third day Max Daily Amount: 1 patch, Disp: 1 patch, Rfl: 0    insulin glargine (BASAGLAR KWIKPEN) 100 units/mL injection pen, Inject 10 Units under the skin daily, Disp: 5 pen, Rfl: 0  Objective:      /80   Pulse (!) 132   Resp 20   Ht 5' 9" (1 753 m)   Wt (!) 162 kg (358 lb)   SpO2 92%   BMI 52 87 kg/m²          Physical Exam   Constitutional:   Morbidly obese   Eyes: Conjunctivae are normal  No scleral icterus  Cardiovascular: Normal rate, regular rhythm and normal heart sounds  No murmur heard    Pulmonary/Chest: Effort normal and breath sounds normal  No respiratory distress  He has no wheezes  He has no rales  Abdominal: Bowel sounds are normal  There is no tenderness  Morbidly obese   Musculoskeletal: He exhibits no edema

## 2018-08-10 NOTE — TELEPHONE ENCOUNTER
Left voicemail  Dr Obey Schofield advised me to follow up on patient, pt was sent to ER yesterday after an elevated bs was found by labs that were drawn earlier

## 2018-08-13 ENCOUNTER — TELEPHONE (OUTPATIENT)
Dept: INTERNAL MEDICINE CLINIC | Facility: CLINIC | Age: 48
End: 2018-08-13

## 2018-08-13 DIAGNOSIS — R52 PAIN: ICD-10-CM

## 2018-08-13 RX ORDER — OXYCODONE HYDROCHLORIDE 20 MG/1
20 TABLET ORAL EVERY 4 HOURS PRN
Qty: 120 TABLET | Refills: 0 | Status: SHIPPED | OUTPATIENT
Start: 2018-08-13 | End: 2018-08-23 | Stop reason: SDUPTHER

## 2018-08-13 NOTE — TELEPHONE ENCOUNTER
Cristal [place] called and is very concerned for Fernanda Mccracken to start new diabetes medication ontop of stopping his pain medication  She is asking if you can reconsider putting him back on the pain med this one time so he doesn't go through withdrawl  She asked if you can call her at [phone]

## 2018-08-19 LAB
AMPHETAMINES UR QL: NEGATIVE NG/ML
BARBITURATES UR QL: NEGATIVE NG/ML
BENZODIAZ UR QL: NEGATIVE NG/ML
BZE UR QL: NEGATIVE NG/ML
CODEINE UR-MCNC: NEGATIVE NG/ML
CREAT UR-MCNC: 75.2 MG/DL
HYDROCODONE UR-MCNC: NEGATIVE NG/ML
HYDROMORPHONE UR-MCNC: NEGATIVE NG/ML
METHADONE UR QL: NEGATIVE NG/ML
MORPHINE UR-MCNC: NEGATIVE NG/ML
NORHYDROCODONE SAL CFM-MCNC: NEGATIVE NG/ML
NOROXYCODONE SAL CFM-MCNC: 8840 NG/ML
OPIATES UR QL: ABNORMAL NG/ML
OXIDANTS UR QL: NEGATIVE MCG/ML
OXYCODONE UR QL: POSITIVE NG/ML
OXYCODONE UR-MCNC: 6390 NG/ML
OXYMORPHONE UR-MCNC: 4080 NG/ML
PCP UR QL: NEGATIVE NG/ML
PH UR: 5.91 [PH]
QUESTION/PROBLEM: NORMAL
SPECIMEN(S) RECEIVED: NORMAL
THC UR QL: NEGATIVE NG/ML

## 2018-08-21 RX ORDER — SITAGLIPTIN 100 MG/1
TABLET, FILM COATED ORAL
COMMUNITY
Start: 2018-08-12 | End: 2018-11-26 | Stop reason: ALTCHOICE

## 2018-08-23 ENCOUNTER — OFFICE VISIT (OUTPATIENT)
Dept: INTERNAL MEDICINE CLINIC | Facility: CLINIC | Age: 48
End: 2018-08-23
Payer: COMMERCIAL

## 2018-08-23 VITALS
OXYGEN SATURATION: 99 % | WEIGHT: 315 LBS | HEART RATE: 96 BPM | SYSTOLIC BLOOD PRESSURE: 118 MMHG | DIASTOLIC BLOOD PRESSURE: 80 MMHG | HEIGHT: 69 IN | BODY MASS INDEX: 46.65 KG/M2

## 2018-08-23 DIAGNOSIS — E03.9 HYPOTHYROIDISM, UNSPECIFIED TYPE: ICD-10-CM

## 2018-08-23 DIAGNOSIS — Z11.4 ENCOUNTER FOR SCREENING FOR HIV: Primary | ICD-10-CM

## 2018-08-23 DIAGNOSIS — E11.9 TYPE 2 DIABETES MELLITUS WITHOUT COMPLICATION, WITH LONG-TERM CURRENT USE OF INSULIN (HCC): ICD-10-CM

## 2018-08-23 DIAGNOSIS — R52 PAIN: ICD-10-CM

## 2018-08-23 DIAGNOSIS — E86.0 DEHYDRATION: ICD-10-CM

## 2018-08-23 DIAGNOSIS — Z79.4 TYPE 2 DIABETES MELLITUS WITHOUT COMPLICATION, WITH LONG-TERM CURRENT USE OF INSULIN (HCC): ICD-10-CM

## 2018-08-23 PROCEDURE — 99214 OFFICE O/P EST MOD 30 MIN: CPT | Performed by: INTERNAL MEDICINE

## 2018-08-23 PROCEDURE — 3008F BODY MASS INDEX DOCD: CPT | Performed by: INTERNAL MEDICINE

## 2018-08-23 RX ORDER — OXYCODONE HYDROCHLORIDE 20 MG/1
20 TABLET ORAL EVERY 4 HOURS PRN
Qty: 120 TABLET | Refills: 0 | Status: SHIPPED | OUTPATIENT
Start: 2018-08-23 | End: 2018-08-23 | Stop reason: SDUPTHER

## 2018-08-23 RX ORDER — OXYCODONE HYDROCHLORIDE 20 MG/1
TABLET ORAL
Qty: 240 TABLET | Refills: 0 | Status: SHIPPED | OUTPATIENT
Start: 2018-08-23 | End: 2018-09-21 | Stop reason: SDUPTHER

## 2018-08-23 RX ORDER — OXYCODONE HYDROCHLORIDE 20 MG/1
TABLET ORAL
Qty: 120 TABLET | Refills: 0 | Status: SHIPPED | OUTPATIENT
Start: 2018-08-23 | End: 2018-09-21 | Stop reason: SDUPTHER

## 2018-08-23 RX ORDER — OXYCODONE HYDROCHLORIDE 20 MG/1
20 TABLET ORAL EVERY 4 HOURS PRN
Qty: 240 TABLET | Refills: 0 | Status: SHIPPED | OUTPATIENT
Start: 2018-08-23 | End: 2018-08-23 | Stop reason: SDUPTHER

## 2018-08-23 RX ORDER — LEVOTHYROXINE SODIUM 0.07 MG/1
75 TABLET ORAL DAILY
Qty: 90 TABLET | Refills: 1 | Status: SHIPPED | OUTPATIENT
Start: 2018-08-23 | End: 2019-08-21 | Stop reason: SDUPTHER

## 2018-08-24 PROBLEM — G89.4 CHRONIC PAIN DISORDER: Status: RESOLVED | Noted: 2017-08-24 | Resolved: 2018-08-24

## 2018-08-24 NOTE — PROGRESS NOTES
Assessment/Plan:         Diagnoses and all orders for this visit:    Encounter for screening for HIV  -     Rapid HIV 1/2 AB-AG Combo; Future    Hypothyroidism, unspecified type  -     levothyroxine 75 mcg tablet; Take 1 tablet (75 mcg total) by mouth daily    Pain  -     Discontinue: oxyCODONE (ROXICODONE) 20 MG TABS; Take 1 tablet (20 mg total) by mouth every 4 (four) hours as needed for moderate pain Earliest Fill Date: 8/23/18 Pt may take 1 to 2 tablets every 4hrs as needed Max Daily Amount: 120 mg  -     Discontinue: oxyCODONE (ROXICODONE) 20 MG TABS; Take 1 tablet (20 mg total) by mouth every 4 (four) hours as needed for moderate pain Earliest Fill Date: 8/23/18 Max Daily Amount: 120 mg  -     oxyCODONE (ROXICODONE) 20 MG TABS; Pt may take 2 tablets every 4hrs as needed  -     oxyCODONE (ROXICODONE) 20 MG TABS; 2 pill PO q 4 hr as needed  He has an appointment to see pain management  Type 2 diabetes mellitus without complication, with long-term current use of insulin ([facility])    -     Dulaglutide 0 75 MG/0 5ML SOPN; Inject 0 75 mg under the skin once a week        Subjective:      Patient ID: Radha Paolmo is a 49 y o  male  Patient was admitted to the hospital over the weekend with uncontrolled hyperglycemia/ newly diagnosed diabetes mellitus 2  He was seen by Endocrinology during recent hospitalization at [facility]   Patient received several L of fluid and was on insulin drip pt  He was discharged on metformin Januvia as well as Trilucity  His blood sugars range between 100-230  His tolerating the rest of the medications well  He would have an appointment to see his endocrinologist at [place]  He will be meeting with the diabetic educator as well  The patient less blurring of his vision  The frequency of the urination has also improved  It does not feel dizzy  He denies any numbness and tingling in his feet  Denies any hyperglycemia    His complete records are not available        Current Outpatient Prescriptions:     ALPRAZolam (XANAX) 0 5 mg tablet, Take 1 tablet (0 5 mg total) by mouth 2 (two) times a day, Disp: 180 tablet, Rfl: 0    citalopram (CeleXA) 20 mg tablet, TAKE 1 TABLET BY MOUTH EVERY DAY, Disp: 90 tablet, Rfl: 0    Dulaglutide 0 75 MG/0 5ML SOPN, Inject 0 75 mg under the skin once a week, Disp: , Rfl:     esomeprazole (NexIUM) 40 MG capsule, Take 40 mg by mouth daily, Disp: , Rfl: 3    fentaNYL (DURAGESIC) 50 mcg/hr, Place 1 patch on the skin every third day Max Daily Amount: 1 patch, Disp: 10 patch, Rfl: 0    fentaNYL (DURAGESIC) 50 mcg/hr, Place 1 patch on the skin every third day Max Daily Amount: 1 patch, Disp: 1 patch, Rfl: 0    Insulin Pen Needle (BD PEN NEEDLE DEXTER U/F) 32G X 4 MM MISC, by Does not apply route daily, Disp: 100 each, Rfl: 0    JANUVIA 100 MG tablet, , Disp: , Rfl:     levothyroxine 75 mcg tablet, Take 1 tablet (75 mcg total) by mouth daily, Disp: 90 tablet, Rfl: 1    metFORMIN (GLUCOPHAGE) 500 mg tablet, Take 1 tablet by mouth Twice daily, Disp: , Rfl:     oxyCODONE (ROXICODONE) 20 MG TABS, Pt may take 2 tablets every 4hrs as needed, Disp: 240 tablet, Rfl: 0    oxyCODONE (ROXICODONE) 20 MG TABS, 2 pill PO q 4 hr as needed, Disp: 120 tablet, Rfl: 0    quinapril (ACCUPRIL) 20 mg tablet, Take 1 tablet (20 mg total) by mouth daily, Disp: 90 tablet, Rfl: 1    rosuvastatin (CRESTOR) 10 MG tablet, Take 10 mg by mouth daily, Disp: , Rfl: 3  The following portions of the patient's history were reviewed and updated as appropriate: allergies, current medications, past family history, past medical history, past social history, past surgical history and problem list     Review of Systems   Constitutional: Negative for chills, fatigue (Last tired) and fever  Eyes: Positive for visual disturbance (Blurring of the vision improving)  Respiratory: Negative for cough and shortness of breath      Cardiovascular: Negative for chest pain, palpitations and leg swelling  Gastrointestinal: Negative for abdominal pain, constipation and diarrhea  Endocrine: Negative for polyuria  As above   Genitourinary: Negative for difficulty urinating and frequency  Musculoskeletal: Positive for arthralgias and back pain  Neurological: Negative for dizziness and headaches  Objective:      /80 (BP Location: Left arm, Patient Position: Sitting, Cuff Size: Extra-Large)   Pulse 96   Ht 5' 9" (1 753 m)   Wt (!) 163 kg (359 lb)   SpO2 99%   BMI 53 02 kg/m²          Physical Exam   Constitutional: He is oriented to person, place, and time  Morbidly obese   Eyes: Conjunctivae are normal  No scleral icterus  Cardiovascular: Normal rate and normal heart sounds  No murmur heard  Pulses:       Dorsalis pedis pulses are 1+ on the right side, and 1+ on the left side  Pulmonary/Chest: Effort normal and breath sounds normal  No respiratory distress  He has no wheezes  He has no rales  Abdominal: Bowel sounds are normal    Obese nontender   Musculoskeletal: He exhibits no edema  Feet:   Right Foot:   Skin Integrity: Negative for ulcer, skin breakdown, erythema, warmth, callus or dry skin  Left Foot:   Skin Integrity: Negative for ulcer, skin breakdown, erythema, warmth, callus or dry skin  Neurological: He is alert and oriented to person, place, and time  Patient's shoes and socks removed  Right Foot/Ankle   Right Foot Inspection  Skin Exam: skin normal and skin intact no dry skin, no warmth, no callus, no erythema, no maceration, no abnormal color, no pre-ulcer, no ulcer and no callus                            Sensory       Monofilament testing: intact  Vascular    The right DP pulse is 1+       Left Foot/Ankle  Left Foot Inspection  Skin Exam: skin normal and skin intactno dry skin, no warmth, no erythema, no maceration, normal color, no pre-ulcer, no ulcer and no callus                                         Sensory       Monofilament: intact  Vascular    The left DP pulse is 1+  Assign Risk Category:  No deformity present;  No loss of protective sensation;        Risk: 0

## 2018-09-10 DIAGNOSIS — R52 PAIN: ICD-10-CM

## 2018-09-10 DIAGNOSIS — G89.4 CHRONIC PAIN SYNDROME: ICD-10-CM

## 2018-09-10 RX ORDER — FENTANYL 50 UG/H
1 PATCH TRANSDERMAL
Qty: 1 PATCH | Refills: 0 | Status: SHIPPED | OUTPATIENT
Start: 2018-09-10 | End: 2018-10-09 | Stop reason: SDUPTHER

## 2018-09-10 RX ORDER — FENTANYL 50 UG/H
1 PATCH TRANSDERMAL
Qty: 10 PATCH | Refills: 0 | Status: SHIPPED | OUTPATIENT
Start: 2018-09-10 | End: 2018-10-09 | Stop reason: SDUPTHER

## 2018-09-16 DIAGNOSIS — E78.5 HYPERLIPIDEMIA, UNSPECIFIED HYPERLIPIDEMIA TYPE: Primary | ICD-10-CM

## 2018-09-17 RX ORDER — ROSUVASTATIN CALCIUM 10 MG/1
TABLET, COATED ORAL
Qty: 90 TABLET | Refills: 3 | Status: SHIPPED | OUTPATIENT
Start: 2018-09-17 | End: 2018-11-26 | Stop reason: ALTCHOICE

## 2018-09-21 DIAGNOSIS — R52 PAIN: ICD-10-CM

## 2018-09-21 RX ORDER — OXYCODONE HYDROCHLORIDE 20 MG/1
TABLET ORAL
Qty: 240 TABLET | Refills: 0 | Status: SHIPPED | OUTPATIENT
Start: 2018-09-21 | End: 2018-09-25 | Stop reason: SDUPTHER

## 2018-09-21 RX ORDER — OXYCODONE HYDROCHLORIDE 20 MG/1
TABLET ORAL
Qty: 120 TABLET | Refills: 0 | Status: SHIPPED | OUTPATIENT
Start: 2018-09-21 | End: 2018-10-19 | Stop reason: SDUPTHER

## 2018-09-25 DIAGNOSIS — R52 PAIN: ICD-10-CM

## 2018-09-25 RX ORDER — OXYCODONE HYDROCHLORIDE 20 MG/1
TABLET ORAL
Qty: 240 TABLET | Refills: 0 | Status: SHIPPED | OUTPATIENT
Start: 2018-09-25 | End: 2018-10-19 | Stop reason: SDUPTHER

## 2018-10-09 DIAGNOSIS — G89.4 CHRONIC PAIN SYNDROME: ICD-10-CM

## 2018-10-09 DIAGNOSIS — R52 PAIN: ICD-10-CM

## 2018-10-09 RX ORDER — FENTANYL 50 UG/H
1 PATCH TRANSDERMAL
Qty: 10 PATCH | Refills: 0 | Status: SHIPPED | OUTPATIENT
Start: 2018-10-09 | End: 2018-11-07 | Stop reason: SDUPTHER

## 2018-10-09 RX ORDER — FENTANYL 50 UG/H
1 PATCH TRANSDERMAL
Qty: 1 PATCH | Refills: 0 | Status: SHIPPED | OUTPATIENT
Start: 2018-10-09 | End: 2018-11-05 | Stop reason: SDUPTHER

## 2018-10-10 DIAGNOSIS — Z79.891 LONG TERM PRESCRIPTION OPIATE USE: Primary | ICD-10-CM

## 2018-10-10 NOTE — PROGRESS NOTES
Patient came in to get scripts and updated contract for opiate use   He also gave a urine screen and printed out an update PMED

## 2018-10-12 ENCOUNTER — TELEPHONE (OUTPATIENT)
Dept: INTERNAL MEDICINE CLINIC | Facility: CLINIC | Age: 48
End: 2018-10-12

## 2018-10-12 LAB
PATH REPORT.SITE OF ORIGIN SPEC: NORMAL
QUESTION/PROBLEM: NORMAL

## 2018-10-12 NOTE — TELEPHONE ENCOUNTER
I spoke with lab and they had me order for pain management profile 6 with add rule out   He said to also type pain med patient is on

## 2018-10-16 LAB
1OH-MIDAZOLAM UR-MCNC: NEGATIVE NG/ML
6MAM UR QL: NEGATIVE NG/ML
A-OH ALPRAZ UR-MCNC: 31 NG/ML
A-OH-TRIAZOLAM UR-MCNC: NEGATIVE NG/ML
AMPHETAMINES UR QL: NEGATIVE NG/ML
BARBITURATES UR QL: NEGATIVE NG/ML
BENZODIAZ UR QL: POSITIVE NG/ML
BZE UR QL: NEGATIVE NG/ML
CODEINE UR-MCNC: NEGATIVE NG/ML
CREAT UR-MCNC: 96.6 MG/DL
ETHANOL UR QL: NEGATIVE NG/ML
FENTANYL UR-MCNC: 9.5 NG/ML
HYDROCODONE UR-MCNC: NEGATIVE NG/ML
HYDROMORPHONE UR-MCNC: NEGATIVE NG/ML
LORAZEPAM UR-MCNC: NEGATIVE NG/ML
Lab: ABNORMAL
Lab: ABNORMAL
MEDICATION PRESCRIBED: ABNORMAL
METHADONE UR QL: NEGATIVE NG/ML
MORPHINE UR-MCNC: NEGATIVE NG/ML
NORDIAZEPAM UR-MCNC: NEGATIVE NG/ML
NORFENTANYL UR-MCNC: 92.3 NG/ML
NORHYDROCODONE SAL CFM-MCNC: NEGATIVE NG/ML
NOROXYCODONE SAL CFM-MCNC: ABNORMAL NG/ML
OPIATES UR QL: ABNORMAL NG/ML
OXAZEPAM UR-MCNC: NEGATIVE NG/ML
OXIDANTS UR QL: NEGATIVE MCG/ML
OXYCODONE UR QL: POSITIVE NG/ML
OXYCODONE UR-MCNC: 7560 NG/ML
OXYMORPHONE UR-MCNC: ABNORMAL NG/ML
PCP UR QL: NEGATIVE NG/ML
PH UR: 6.12 [PH]
REF LAB TEST NAME: NORMAL
REF LAB TEST: NORMAL
SL AMB AMINOCLONAZEPAM: NEGATIVE NG/ML
SL AMB CLIENT CONTACT: NORMAL
SL AMB HYDROXYETHYLFLURAZEPAM: NEGATIVE NG/ML
SL AMB MEDMATCH 6 ACETYLMORPHINE: ABNORMAL
SL AMB MEDMATCH ALCOHOL METAB: ABNORMAL
SL AMB MEDMATCH AMINOCLONAZEPAM: ABNORMAL
SL AMB MEDMATCH AMPTHETAMINES: ABNORMAL
SL AMB MEDMATCH AOH ALPRAZOLAM: ABNORMAL
SL AMB MEDMATCH AOH MIDAZOLAM: ABNORMAL
SL AMB MEDMATCH AOH TRIAZOLAM: ABNORMAL
SL AMB MEDMATCH BARBITUATES: ABNORMAL
SL AMB MEDMATCH COCAINE METABOLITE: ABNORMAL
SL AMB MEDMATCH CODEINE: ABNORMAL
SL AMB MEDMATCH HYDROCODONE: ABNORMAL
SL AMB MEDMATCH HYDROMORPHONE: ABNORMAL
SL AMB MEDMATCH LORAZEPAM: ABNORMAL
SL AMB MEDMATCH MARIJUANA METABOLITE: ABNORMAL
SL AMB MEDMATCH METHADONE METABOLITE: ABNORMAL
SL AMB MEDMATCH MORPHINE: ABNORMAL
SL AMB MEDMATCH NORDIAZEPAM: ABNORMAL
SL AMB MEDMATCH NORHYDROCODONE: ABNORMAL
SL AMB MEDMATCH NOROXYCODONE: ABNORMAL
SL AMB MEDMATCH OH, ET FLURAZEPAM: ABNORMAL
SL AMB MEDMATCH OXAZEPAM: ABNORMAL
SL AMB MEDMATCH OXYCODONE: ABNORMAL
SL AMB MEDMATCH OXYMORPHONE: ABNORMAL
SL AMB MEDMATCH PHENCYCLIDINE: ABNORMAL
SL AMB MEDMATCH TEMAZEPAM: ABNORMAL
TEMAZEPAM UR-MCNC: NEGATIVE NG/ML
THC UR QL: NEGATIVE NG/ML

## 2018-10-19 DIAGNOSIS — R52 PAIN: ICD-10-CM

## 2018-10-19 RX ORDER — OXYCODONE HYDROCHLORIDE 20 MG/1
TABLET ORAL
Qty: 120 TABLET | Refills: 0 | Status: SHIPPED | OUTPATIENT
Start: 2018-10-19 | End: 2018-11-13 | Stop reason: SDUPTHER

## 2018-10-19 RX ORDER — OXYCODONE HYDROCHLORIDE 20 MG/1
TABLET ORAL
Qty: 240 TABLET | Refills: 0 | Status: SHIPPED | OUTPATIENT
Start: 2018-10-19 | End: 2018-11-13 | Stop reason: SDUPTHER

## 2018-10-21 DIAGNOSIS — I15.9 SECONDARY HYPERTENSION: ICD-10-CM

## 2018-10-22 DIAGNOSIS — F32.A DEPRESSION, UNSPECIFIED DEPRESSION TYPE: ICD-10-CM

## 2018-10-22 RX ORDER — CITALOPRAM 20 MG/1
TABLET ORAL
Qty: 90 TABLET | Refills: 0 | Status: SHIPPED | OUTPATIENT
Start: 2018-10-22 | End: 2019-01-23 | Stop reason: SDUPTHER

## 2018-10-22 RX ORDER — QUINAPRIL 20 MG/1
TABLET ORAL
Qty: 90 TABLET | Refills: 1 | Status: SHIPPED | OUTPATIENT
Start: 2018-10-22 | End: 2019-04-21 | Stop reason: SDUPTHER

## 2018-11-01 NOTE — PROGRESS NOTES
Pain management appointment 12/12/2018 with a [facility]    Patient has a follow up with us 12/20/2018

## 2018-11-03 DIAGNOSIS — E11.9 TYPE 2 DIABETES MELLITUS WITHOUT COMPLICATION, WITHOUT LONG-TERM CURRENT USE OF INSULIN (HCC): ICD-10-CM

## 2018-11-05 DIAGNOSIS — G89.4 CHRONIC PAIN SYNDROME: ICD-10-CM

## 2018-11-05 DIAGNOSIS — R52 PAIN: ICD-10-CM

## 2018-11-05 RX ORDER — INSULIN GLARGINE 100 [IU]/ML
10 INJECTION, SOLUTION SUBCUTANEOUS DAILY
Refills: 0 | OUTPATIENT
Start: 2018-11-05

## 2018-11-05 NOTE — TELEPHONE ENCOUNTER
Patient called and requested a medication refill for Fentanyl Patch 50 mcg patches-apply 1 patch on skin every third day and Xanax 0 5 mg tablets-1 1 tablet daily 2 times daily    [facility]

## 2018-11-06 DIAGNOSIS — R52 PAIN: ICD-10-CM

## 2018-11-06 DIAGNOSIS — G89.4 CHRONIC PAIN SYNDROME: ICD-10-CM

## 2018-11-06 RX ORDER — ALPRAZOLAM 0.5 MG/1
0.5 TABLET ORAL 2 TIMES DAILY
Qty: 180 TABLET | Refills: 0 | Status: SHIPPED | OUTPATIENT
Start: 2018-11-06 | End: 2019-02-08 | Stop reason: SDUPTHER

## 2018-11-06 RX ORDER — FENTANYL 50 UG/H
1 PATCH TRANSDERMAL
Qty: 1 PATCH | Refills: 0 | Status: SHIPPED | OUTPATIENT
Start: 2018-11-06 | End: 2018-11-07 | Stop reason: SDUPTHER

## 2018-11-06 NOTE — TELEPHONE ENCOUNTER
Patient called in regards to his medication refills that he requested    Please call patient to advise [phone]

## 2018-11-07 RX ORDER — FENTANYL 50 UG/H
1 PATCH TRANSDERMAL
Qty: 10 PATCH | Refills: 0 | Status: SHIPPED | OUTPATIENT
Start: 2018-11-07 | End: 2018-12-11 | Stop reason: SDUPTHER

## 2018-11-07 RX ORDER — FENTANYL 50 UG/H
1 PATCH TRANSDERMAL
Qty: 1 PATCH | Refills: 0 | Status: SHIPPED | OUTPATIENT
Start: 2018-11-07 | End: 2018-12-11 | Stop reason: SDUPTHER

## 2018-11-07 NOTE — TELEPHONE ENCOUNTER
I did not know about the fentanyl so I just sent that to be approved   Patient will be here tomorrow to  scripts

## 2018-11-13 DIAGNOSIS — R52 PAIN: ICD-10-CM

## 2018-11-13 NOTE — TELEPHONE ENCOUNTER
Patient was just called that pain specialist can not take him on as patient  Patient left a message with dr Karolyn Mobley

## 2018-11-14 RX ORDER — OXYCODONE HYDROCHLORIDE 20 MG/1
TABLET ORAL
Qty: 240 TABLET | Refills: 0 | Status: SHIPPED | OUTPATIENT
Start: 2018-11-14 | End: 2018-11-15 | Stop reason: SDUPTHER

## 2018-11-14 RX ORDER — OXYCODONE HYDROCHLORIDE 20 MG/1
TABLET ORAL
Qty: 120 TABLET | Refills: 0 | Status: SHIPPED | OUTPATIENT
Start: 2018-11-14 | End: 2018-12-18 | Stop reason: SDUPTHER

## 2018-11-15 DIAGNOSIS — R52 PAIN: ICD-10-CM

## 2018-11-15 RX ORDER — OXYCODONE HYDROCHLORIDE 20 MG/1
TABLET ORAL
Qty: 240 TABLET | Refills: 0 | Status: SHIPPED | OUTPATIENT
Start: 2018-11-15 | End: 2018-12-18 | Stop reason: SDUPTHER

## 2018-11-18 DIAGNOSIS — E11.9 TYPE 2 DIABETES MELLITUS WITHOUT COMPLICATION, WITHOUT LONG-TERM CURRENT USE OF INSULIN (HCC): ICD-10-CM

## 2018-11-19 ENCOUNTER — TELEPHONE (OUTPATIENT)
Dept: FAMILY MEDICINE CLINIC | Facility: CLINIC | Age: 48
End: 2018-11-19

## 2018-11-19 RX ORDER — INSULIN GLARGINE 100 [IU]/ML
10 INJECTION, SOLUTION SUBCUTANEOUS DAILY
Refills: 0 | OUTPATIENT
Start: 2018-11-19

## 2018-11-19 NOTE — TELEPHONE ENCOUNTER
Patient is scheduled Monday at 5 so wife can come to  Did you want him to wait to get script until then or is it ok for him to get now

## 2018-11-19 NOTE — TELEPHONE ENCOUNTER
Patient called and wanted to speak to Brent Sanchez in regards to his medication  Please call him to advise [phone]

## 2018-11-21 DIAGNOSIS — R10.13 DYSPEPSIA: Primary | ICD-10-CM

## 2018-11-21 RX ORDER — LANSOPRAZOLE 30 MG/1
CAPSULE, DELAYED RELEASE ORAL
Qty: 90 CAPSULE | Refills: 0 | Status: SHIPPED | OUTPATIENT
Start: 2018-11-21 | End: 2019-02-15 | Stop reason: SDUPTHER

## 2018-11-26 ENCOUNTER — OFFICE VISIT (OUTPATIENT)
Dept: FAMILY MEDICINE CLINIC | Facility: CLINIC | Age: 48
End: 2018-11-26
Payer: COMMERCIAL

## 2018-11-26 VITALS
BODY MASS INDEX: 46.65 KG/M2 | HEART RATE: 100 BPM | SYSTOLIC BLOOD PRESSURE: 152 MMHG | HEIGHT: 69 IN | OXYGEN SATURATION: 95 % | DIASTOLIC BLOOD PRESSURE: 80 MMHG | WEIGHT: 315 LBS

## 2018-11-26 DIAGNOSIS — F11.20 CHRONIC NARCOTIC DEPENDENCE (HCC): Primary | ICD-10-CM

## 2018-11-26 DIAGNOSIS — M15.8 OTHER OSTEOARTHRITIS INVOLVING MULTIPLE JOINTS: ICD-10-CM

## 2018-11-26 PROCEDURE — 99214 OFFICE O/P EST MOD 30 MIN: CPT | Performed by: INTERNAL MEDICINE

## 2018-11-26 PROCEDURE — 3008F BODY MASS INDEX DOCD: CPT | Performed by: INTERNAL MEDICINE

## 2018-11-26 PROCEDURE — 1036F TOBACCO NON-USER: CPT | Performed by: INTERNAL MEDICINE

## 2018-11-26 NOTE — LETTER
November 26, 2018     Patient: Juvenal Anton   YOB: 1970   Date of Visit: 11/26/2018       To Whom it May Concern:    Juvenal Anton is under my professional care  He was seen in my office on 11/26/2018  If you have any questions or concerns, please don't hesitate to call           Sincerely,          Moira Stanton MD        CC: No Recipients

## 2018-11-27 NOTE — PROGRESS NOTES
Assessment/Plan:         Diagnoses and all orders for this visit:    Chronic narcotic dependence ([address] )   see discussion HPI  Other osteoarthritis involving multiple joints        Subjective:      Patient ID: Mechelle Wyman is a 49 y o  male  HPI   patient is here accompanied by his wife to discuss plan of care for his chronic narcotic dependency  Patient has history of degenerative arthropathy has been on several  narcotics that we had some like to cut back over the last several years since he became my patient in 2016 referred by his friend  We had discussed reduction of his pain medication which patient had agreed in our initial meeting  To recap he was on 150 mg of fentanyl were able to cut down to nearly 50 mg of fentanyl  He remains on oxycodone 40 mg every 4 hours since then  Further reduction has been problematic for the patient  Unfortunately patient has been unable to successfully establish himself with another pain specialist or sleep study  Referral to sleep study would be me today again  I discussed with the patient different strategies including pain medication referral versus detox  Continuing patient on narcotics is certainly not an option  We discuss different treatment options including a detox which patient wants to go through  Patient's wife improves as well        The following portions of the patient's history were reviewed and updated as appropriate: allergies, current medications, past medical history, past social history, past surgical history and problem list     Review of Systems      Objective:      /80 (BP Location: Left arm, Patient Position: Sitting)   Pulse 100   Ht 5' 9" (1 753 m)   Wt (!) 155 kg (342 lb)   SpO2 95%   BMI 50 50 kg/m²          Physical Exam

## 2018-12-11 DIAGNOSIS — R52 PAIN: ICD-10-CM

## 2018-12-11 DIAGNOSIS — G89.4 CHRONIC PAIN SYNDROME: ICD-10-CM

## 2018-12-11 RX ORDER — FENTANYL 50 UG/H
1 PATCH TRANSDERMAL
Qty: 10 PATCH | Refills: 0 | Status: SHIPPED | OUTPATIENT
Start: 2018-12-11 | End: 2019-01-10 | Stop reason: SDUPTHER

## 2018-12-11 RX ORDER — FENTANYL 50 UG/H
1 PATCH TRANSDERMAL
Qty: 1 PATCH | Refills: 0 | Status: SHIPPED | OUTPATIENT
Start: 2018-12-11 | End: 2019-01-10 | Stop reason: SDUPTHER

## 2018-12-18 DIAGNOSIS — R52 PAIN: ICD-10-CM

## 2018-12-18 NOTE — TELEPHONE ENCOUNTER
Patient did not receive a call from clinic after being told multiple time patient would be taken care of   Please advise what to do next

## 2018-12-19 RX ORDER — OXYCODONE HYDROCHLORIDE 20 MG/1
TABLET ORAL
Qty: 120 TABLET | Refills: 0 | Status: SHIPPED | OUTPATIENT
Start: 2018-12-19 | End: 2019-01-21 | Stop reason: SDUPTHER

## 2018-12-19 RX ORDER — OXYCODONE HYDROCHLORIDE 20 MG/1
TABLET ORAL
Qty: 240 TABLET | Refills: 0 | Status: SHIPPED | OUTPATIENT
Start: 2018-12-19 | End: 2019-01-22 | Stop reason: SDUPTHER

## 2018-12-23 DIAGNOSIS — R10.13 DYSPEPSIA: Primary | ICD-10-CM

## 2018-12-24 RX ORDER — ESOMEPRAZOLE MAGNESIUM 40 MG/1
CAPSULE, DELAYED RELEASE ORAL
Qty: 90 CAPSULE | Refills: 3 | Status: SHIPPED | OUTPATIENT
Start: 2018-12-24 | End: 2019-06-28

## 2019-01-09 ENCOUNTER — TELEPHONE (OUTPATIENT)
Dept: FAMILY MEDICINE CLINIC | Facility: CLINIC | Age: 49
End: 2019-01-09

## 2019-01-09 NOTE — TELEPHONE ENCOUNTER
Patient called and requested a medication refill for fentaNYL (DURAGESIC) 50 mcg/hr-Place 1 patch on the skin every third day    He would like it called into [facility] [phone]

## 2019-01-10 DIAGNOSIS — R52 PAIN: ICD-10-CM

## 2019-01-10 DIAGNOSIS — G89.4 CHRONIC PAIN SYNDROME: ICD-10-CM

## 2019-01-10 DIAGNOSIS — T40.2X4A OPIOID OVERDOSE, UNDETERMINED INTENT, INITIAL ENCOUNTER (HCC): Primary | ICD-10-CM

## 2019-01-10 RX ORDER — NALOXONE HYDROCHLORIDE 4 MG/.1ML
SPRAY NASAL
Qty: 2 EACH | Refills: 2 | Status: SHIPPED | OUTPATIENT
Start: 2019-01-10 | End: 2019-06-28

## 2019-01-10 RX ORDER — FENTANYL 50 UG/H
1 PATCH TRANSDERMAL
Qty: 10 PATCH | Refills: 0 | Status: SHIPPED | OUTPATIENT
Start: 2019-01-10 | End: 2019-02-08 | Stop reason: SDUPTHER

## 2019-01-10 RX ORDER — FENTANYL 50 UG/H
1 PATCH TRANSDERMAL
Qty: 1 PATCH | Refills: 0 | Status: SHIPPED | OUTPATIENT
Start: 2019-01-10 | End: 2019-02-08 | Stop reason: SDUPTHER

## 2019-01-18 ENCOUNTER — TELEPHONE (OUTPATIENT)
Dept: FAMILY MEDICINE CLINIC | Facility: CLINIC | Age: 49
End: 2019-01-18

## 2019-01-18 DIAGNOSIS — R52 PAIN: ICD-10-CM

## 2019-01-18 NOTE — TELEPHONE ENCOUNTER
Phone call from pt, refill Oxycodone 20mg, pt states he needs 2 scripts  One script is for #120 2 pills every 4 hrs & the other is #240 2 pills every 4 hrs  Call to [address] [place] [address]  [phone]

## 2019-01-21 DIAGNOSIS — R52 PAIN: ICD-10-CM

## 2019-01-21 NOTE — TELEPHONE ENCOUNTER
Patient called and requested a medication refill for Oxycodone   20 mg  He has 2 scripts for this medication, the first one is for Oxycodone 20 mg #120 and the other is for Oxycodone 20 mg #240, take 2 pills every 4 hours   This needs to be called into [facility] in [address]

## 2019-01-22 DIAGNOSIS — R52 PAIN: ICD-10-CM

## 2019-01-22 RX ORDER — OXYCODONE HYDROCHLORIDE 20 MG/1
TABLET ORAL
Qty: 120 TABLET | Refills: 0 | Status: SHIPPED | OUTPATIENT
Start: 2019-01-22 | End: 2019-02-15 | Stop reason: SDUPTHER

## 2019-01-22 RX ORDER — OXYCODONE HYDROCHLORIDE 20 MG/1
TABLET ORAL
Qty: 240 TABLET | Refills: 0 | Status: SHIPPED | OUTPATIENT
Start: 2019-01-22 | End: 2019-02-15 | Stop reason: SDUPTHER

## 2019-01-23 DIAGNOSIS — F32.A DEPRESSION, UNSPECIFIED DEPRESSION TYPE: ICD-10-CM

## 2019-01-24 RX ORDER — CITALOPRAM 20 MG/1
TABLET ORAL
Qty: 90 TABLET | Refills: 0 | Status: SHIPPED | OUTPATIENT
Start: 2019-01-24 | End: 2019-04-22 | Stop reason: SDUPTHER

## 2019-02-08 DIAGNOSIS — G89.4 CHRONIC PAIN SYNDROME: ICD-10-CM

## 2019-02-08 DIAGNOSIS — R52 PAIN: ICD-10-CM

## 2019-02-08 RX ORDER — FENTANYL 50 UG/H
1 PATCH TRANSDERMAL
Qty: 1 PATCH | Refills: 0 | Status: SHIPPED | OUTPATIENT
Start: 2019-02-08 | End: 2019-03-12 | Stop reason: SDUPTHER

## 2019-02-08 RX ORDER — FENTANYL 50 UG/H
1 PATCH TRANSDERMAL
Qty: 10 PATCH | Refills: 0 | Status: SHIPPED | OUTPATIENT
Start: 2019-02-08 | End: 2019-03-12 | Stop reason: SDUPTHER

## 2019-02-08 NOTE — TELEPHONE ENCOUNTER
Next time when we order the oxy can we send for lower dose at 15mg  Then the march script for fentynal 25  Patient is in the process of getting into a outpatient for medication change but he is on a wait list  Patient want to start to decrease himself

## 2019-02-11 RX ORDER — ALPRAZOLAM 0.5 MG/1
TABLET ORAL
Qty: 180 TABLET | Refills: 0 | Status: SHIPPED | OUTPATIENT
Start: 2019-02-11 | End: 2019-03-12 | Stop reason: SDUPTHER

## 2019-02-15 ENCOUNTER — TELEPHONE (OUTPATIENT)
Dept: FAMILY MEDICINE CLINIC | Facility: CLINIC | Age: 49
End: 2019-02-15

## 2019-02-15 DIAGNOSIS — R10.13 DYSPEPSIA: ICD-10-CM

## 2019-02-15 RX ORDER — LANSOPRAZOLE 30 MG/1
CAPSULE, DELAYED RELEASE ORAL
Qty: 90 CAPSULE | Refills: 0 | Status: SHIPPED | OUTPATIENT
Start: 2019-02-15 | End: 2019-05-15 | Stop reason: SDUPTHER

## 2019-02-15 RX ORDER — OXYCODONE HYDROCHLORIDE 15 MG/1
TABLET ORAL
Qty: 240 TABLET | Refills: 0 | Status: SHIPPED | OUTPATIENT
Start: 2019-02-15 | End: 2019-03-12 | Stop reason: SDUPTHER

## 2019-02-15 RX ORDER — OXYCODONE HYDROCHLORIDE 15 MG/1
TABLET ORAL
Qty: 120 TABLET | Refills: 0 | Status: SHIPPED | OUTPATIENT
Start: 2019-02-15 | End: 2019-03-12 | Stop reason: SDUPTHER

## 2019-02-15 NOTE — TELEPHONE ENCOUNTER
Patient called and wants to speak with you about changing script to lower dose, he is ok with lower dose of meds and please call in today, it is for his pain med

## 2019-02-18 ENCOUNTER — TELEPHONE (OUTPATIENT)
Dept: FAMILY MEDICINE CLINIC | Facility: CLINIC | Age: 49
End: 2019-02-18

## 2019-03-11 DIAGNOSIS — R52 PAIN: ICD-10-CM

## 2019-03-11 DIAGNOSIS — G89.4 CHRONIC PAIN SYNDROME: ICD-10-CM

## 2019-03-11 NOTE — TELEPHONE ENCOUNTER
Patient called and requested a medication refill for fentaNYL (DURAGESIC) 50 mcg/hr - Place 1 patch on the skin every third day #1 and also fentaNYL (DURAGESIC) 50 mcg/hr - Place 1 patch on the skin every third day #10  Please send this to [facility] [phone]

## 2019-03-13 RX ORDER — FENTANYL 50 UG/H
1 PATCH TRANSDERMAL
Qty: 1 PATCH | Refills: 0 | Status: SHIPPED | OUTPATIENT
Start: 2019-03-13 | End: 2019-06-27

## 2019-03-13 RX ORDER — FENTANYL 50 UG/H
1 PATCH TRANSDERMAL
Qty: 10 PATCH | Refills: 0 | Status: SHIPPED | OUTPATIENT
Start: 2019-03-13 | End: 2019-04-05 | Stop reason: SDUPTHER

## 2019-03-13 RX ORDER — ALPRAZOLAM 0.5 MG/1
0.5 TABLET ORAL 2 TIMES DAILY
Qty: 180 TABLET | Refills: 0 | Status: SHIPPED | OUTPATIENT
Start: 2019-03-13 | End: 2019-04-05 | Stop reason: SDUPTHER

## 2019-03-13 RX ORDER — OXYCODONE HYDROCHLORIDE 15 MG/1
TABLET ORAL
Qty: 120 TABLET | Refills: 0 | Status: SHIPPED | OUTPATIENT
Start: 2019-03-13 | End: 2019-04-09 | Stop reason: SDUPTHER

## 2019-03-13 RX ORDER — OXYCODONE HYDROCHLORIDE 15 MG/1
TABLET ORAL
Qty: 240 TABLET | Refills: 0 | Status: SHIPPED | OUTPATIENT
Start: 2019-03-13 | End: 2019-04-09 | Stop reason: SDUPTHER

## 2019-04-05 DIAGNOSIS — Z79.4 TYPE 2 DIABETES MELLITUS WITHOUT COMPLICATION, WITH LONG-TERM CURRENT USE OF INSULIN (HCC): Primary | ICD-10-CM

## 2019-04-05 DIAGNOSIS — G89.4 CHRONIC PAIN SYNDROME: ICD-10-CM

## 2019-04-05 DIAGNOSIS — R52 PAIN: ICD-10-CM

## 2019-04-05 DIAGNOSIS — E11.9 TYPE 2 DIABETES MELLITUS WITHOUT COMPLICATION, WITH LONG-TERM CURRENT USE OF INSULIN (HCC): Primary | ICD-10-CM

## 2019-04-08 DIAGNOSIS — R52 PAIN: ICD-10-CM

## 2019-04-08 DIAGNOSIS — G89.4 CHRONIC PAIN SYNDROME: ICD-10-CM

## 2019-04-08 RX ORDER — OXYCODONE HYDROCHLORIDE 15 MG/1
TABLET ORAL
Qty: 240 TABLET | Refills: 0 | Status: CANCELLED | OUTPATIENT
Start: 2019-04-08

## 2019-04-08 RX ORDER — OXYCODONE HYDROCHLORIDE 15 MG/1
TABLET ORAL
Qty: 120 TABLET | Refills: 0 | Status: CANCELLED | OUTPATIENT
Start: 2019-04-08

## 2019-04-08 RX ORDER — FENTANYL 50 UG/H
1 PATCH TRANSDERMAL
Qty: 1 PATCH | Refills: 0 | Status: CANCELLED | OUTPATIENT
Start: 2019-04-08

## 2019-04-09 ENCOUNTER — TELEPHONE (OUTPATIENT)
Dept: FAMILY MEDICINE CLINIC | Facility: CLINIC | Age: 49
End: 2019-04-09

## 2019-04-09 DIAGNOSIS — R52 PAIN: ICD-10-CM

## 2019-04-09 RX ORDER — OXYCODONE HYDROCHLORIDE 15 MG/1
TABLET ORAL
Qty: 240 TABLET | Refills: 0 | Status: SHIPPED | OUTPATIENT
Start: 2019-04-09 | End: 2019-06-27

## 2019-04-09 RX ORDER — ALPRAZOLAM 0.5 MG/1
0.5 TABLET ORAL 2 TIMES DAILY
Qty: 180 TABLET | Refills: 0 | Status: SHIPPED | OUTPATIENT
Start: 2019-04-09 | End: 2019-06-28 | Stop reason: SDUPTHER

## 2019-04-09 RX ORDER — FENTANYL 50 UG/H
1 PATCH TRANSDERMAL
Qty: 10 PATCH | Refills: 0 | Status: SHIPPED | OUTPATIENT
Start: 2019-04-09 | End: 2019-06-27

## 2019-04-09 RX ORDER — OXYCODONE HYDROCHLORIDE 15 MG/1
TABLET ORAL
Qty: 120 TABLET | Refills: 0 | Status: SHIPPED | OUTPATIENT
Start: 2019-04-09 | End: 2019-06-27

## 2019-04-10 ENCOUNTER — TELEPHONE (OUTPATIENT)
Dept: FAMILY MEDICINE CLINIC | Facility: CLINIC | Age: 49
End: 2019-04-10

## 2019-04-21 DIAGNOSIS — I15.9 SECONDARY HYPERTENSION: ICD-10-CM

## 2019-04-22 DIAGNOSIS — F32.A DEPRESSION, UNSPECIFIED DEPRESSION TYPE: ICD-10-CM

## 2019-04-22 RX ORDER — QUINAPRIL 20 MG/1
TABLET ORAL
Qty: 90 TABLET | Refills: 1 | Status: SHIPPED | OUTPATIENT
Start: 2019-04-22 | End: 2022-02-25 | Stop reason: SDUPTHER

## 2019-04-22 RX ORDER — CITALOPRAM 20 MG/1
TABLET ORAL
Qty: 90 TABLET | Refills: 0 | Status: SHIPPED | OUTPATIENT
Start: 2019-04-22 | End: 2019-09-11 | Stop reason: SDUPTHER

## 2019-05-02 ENCOUNTER — TELEPHONE (OUTPATIENT)
Dept: FAMILY MEDICINE CLINIC | Facility: CLINIC | Age: 49
End: 2019-05-02

## 2019-05-13 ENCOUNTER — TELEPHONE (OUTPATIENT)
Dept: FAMILY MEDICINE CLINIC | Facility: CLINIC | Age: 49
End: 2019-05-13

## 2019-05-15 DIAGNOSIS — R10.13 DYSPEPSIA: ICD-10-CM

## 2019-05-15 RX ORDER — LANSOPRAZOLE 30 MG/1
CAPSULE, DELAYED RELEASE ORAL
Qty: 90 CAPSULE | Refills: 0 | Status: SHIPPED | OUTPATIENT
Start: 2019-05-15 | End: 2019-09-03 | Stop reason: SDUPTHER

## 2019-06-28 ENCOUNTER — OFFICE VISIT (OUTPATIENT)
Dept: FAMILY MEDICINE CLINIC | Facility: CLINIC | Age: 49
End: 2019-06-28
Payer: COMMERCIAL

## 2019-06-28 ENCOUNTER — TELEPHONE (OUTPATIENT)
Dept: FAMILY MEDICINE CLINIC | Facility: CLINIC | Age: 49
End: 2019-06-28

## 2019-06-28 VITALS
BODY MASS INDEX: 46.65 KG/M2 | DIASTOLIC BLOOD PRESSURE: 78 MMHG | HEART RATE: 97 BPM | SYSTOLIC BLOOD PRESSURE: 118 MMHG | HEIGHT: 69 IN | OXYGEN SATURATION: 98 % | WEIGHT: 315 LBS

## 2019-06-28 DIAGNOSIS — Z13.29 SCREENING FOR THYROID DISORDER: ICD-10-CM

## 2019-06-28 DIAGNOSIS — Z79.4 TYPE 2 DIABETES MELLITUS WITHOUT COMPLICATION, WITH LONG-TERM CURRENT USE OF INSULIN (HCC): Primary | ICD-10-CM

## 2019-06-28 DIAGNOSIS — F41.9 ANXIETY: ICD-10-CM

## 2019-06-28 DIAGNOSIS — E11.9 TYPE 2 DIABETES MELLITUS WITHOUT COMPLICATION, WITH LONG-TERM CURRENT USE OF INSULIN (HCC): Primary | ICD-10-CM

## 2019-06-28 DIAGNOSIS — R52 PAIN: ICD-10-CM

## 2019-06-28 DIAGNOSIS — Z13.220 SCREENING FOR LIPID DISORDERS: ICD-10-CM

## 2019-06-28 DIAGNOSIS — Z13.89 SCREENING FOR GENITOURINARY CONDITION: ICD-10-CM

## 2019-06-28 DIAGNOSIS — Z13.21 ENCOUNTER FOR VITAMIN DEFICIENCY SCREENING: ICD-10-CM

## 2019-06-28 PROCEDURE — 1036F TOBACCO NON-USER: CPT | Performed by: INTERNAL MEDICINE

## 2019-06-28 PROCEDURE — 3008F BODY MASS INDEX DOCD: CPT | Performed by: INTERNAL MEDICINE

## 2019-06-28 PROCEDURE — 99214 OFFICE O/P EST MOD 30 MIN: CPT | Performed by: INTERNAL MEDICINE

## 2019-06-28 RX ORDER — ALPRAZOLAM 0.5 MG/1
0.5 TABLET ORAL 2 TIMES DAILY
Qty: 180 TABLET | Refills: 0 | Status: SHIPPED | OUTPATIENT
Start: 2019-06-28 | End: 2019-10-17 | Stop reason: SDUPTHER

## 2019-06-28 NOTE — TELEPHONE ENCOUNTER
[facility] pharmacy called in regards to patient's medication refill for ALPRAZolam (XANAX) 0 5 mg tablet- take 1 tablet (0 5 mg total) by mouth 2 times a day #180  The pharmacy called to confirm that it was ok to fill because according to the insurance it is too early for a refill  They said that it can't be filled till August 8th  Please advise the pharmacy [phone]

## 2019-06-28 NOTE — TELEPHONE ENCOUNTER
Patient did not receive the whole script due to the transition of rehab per patient but please advise

## 2019-08-21 DIAGNOSIS — E03.9 HYPOTHYROIDISM, UNSPECIFIED TYPE: ICD-10-CM

## 2019-08-23 RX ORDER — LEVOTHYROXINE SODIUM 0.07 MG/1
TABLET ORAL
Qty: 90 TABLET | Refills: 3 | Status: SHIPPED | OUTPATIENT
Start: 2019-08-23 | End: 2020-10-29 | Stop reason: SDUPTHER

## 2019-09-03 DIAGNOSIS — R10.13 DYSPEPSIA: ICD-10-CM

## 2019-09-04 RX ORDER — LANSOPRAZOLE 30 MG/1
CAPSULE, DELAYED RELEASE ORAL
Qty: 90 CAPSULE | Refills: 0 | Status: SHIPPED | OUTPATIENT
Start: 2019-09-04 | End: 2020-10-29 | Stop reason: SDUPTHER

## 2019-09-11 DIAGNOSIS — F32.A DEPRESSION, UNSPECIFIED DEPRESSION TYPE: ICD-10-CM

## 2019-09-11 RX ORDER — CITALOPRAM 20 MG/1
TABLET ORAL
Qty: 90 TABLET | Refills: 0 | Status: SHIPPED | OUTPATIENT
Start: 2019-09-11 | End: 2020-06-08 | Stop reason: SDUPTHER

## 2019-09-24 DIAGNOSIS — Z79.4 TYPE 2 DIABETES MELLITUS WITHOUT COMPLICATION, WITH LONG-TERM CURRENT USE OF INSULIN (HCC): ICD-10-CM

## 2019-09-24 DIAGNOSIS — E11.9 TYPE 2 DIABETES MELLITUS WITHOUT COMPLICATION, WITH LONG-TERM CURRENT USE OF INSULIN (HCC): ICD-10-CM

## 2019-10-17 DIAGNOSIS — R52 PAIN: ICD-10-CM

## 2019-10-17 RX ORDER — ALPRAZOLAM 0.5 MG/1
0.5 TABLET ORAL 2 TIMES DAILY
Qty: 180 TABLET | Refills: 0 | Status: SHIPPED | OUTPATIENT
Start: 2019-10-17 | End: 2019-10-17 | Stop reason: SDUPTHER

## 2019-10-17 RX ORDER — ALPRAZOLAM 0.5 MG/1
0.5 TABLET ORAL 2 TIMES DAILY
Qty: 180 TABLET | Refills: 0 | Status: SHIPPED | OUTPATIENT
Start: 2019-10-17 | End: 2020-01-14

## 2020-01-14 DIAGNOSIS — R52 PAIN: ICD-10-CM

## 2020-01-14 RX ORDER — ALPRAZOLAM 0.5 MG/1
0.5 TABLET ORAL 2 TIMES DAILY
Qty: 180 TABLET | Refills: 0 | Status: SHIPPED | OUTPATIENT
Start: 2020-01-14 | End: 2020-06-08 | Stop reason: SDUPTHER

## 2020-05-04 ENCOUNTER — TRANSITIONAL CARE MANAGEMENT (OUTPATIENT)
Dept: FAMILY MEDICINE CLINIC | Facility: CLINIC | Age: 50
End: 2020-05-04

## 2020-05-04 ENCOUNTER — TELEMEDICINE (OUTPATIENT)
Dept: FAMILY MEDICINE CLINIC | Facility: CLINIC | Age: 50
End: 2020-05-04
Payer: COMMERCIAL

## 2020-05-04 DIAGNOSIS — Z79.4 TYPE 2 DIABETES MELLITUS WITHOUT COMPLICATION, WITH LONG-TERM CURRENT USE OF INSULIN (HCC): ICD-10-CM

## 2020-05-04 DIAGNOSIS — E11.9 TYPE 2 DIABETES MELLITUS WITHOUT COMPLICATION, WITH LONG-TERM CURRENT USE OF INSULIN (HCC): ICD-10-CM

## 2020-05-04 DIAGNOSIS — R04.2 HEMOPTYSIS: Primary | ICD-10-CM

## 2020-05-04 DIAGNOSIS — J18.9 PNEUMONIA DUE TO INFECTIOUS ORGANISM, UNSPECIFIED LATERALITY, UNSPECIFIED PART OF LUNG: ICD-10-CM

## 2020-05-04 PROCEDURE — 99496 TRANSJ CARE MGMT HIGH F2F 7D: CPT | Performed by: INTERNAL MEDICINE

## 2020-05-04 RX ORDER — DULAGLUTIDE 0.75 MG/.5ML
INJECTION, SOLUTION SUBCUTANEOUS
COMMUNITY
Start: 2020-04-13 | End: 2020-10-29 | Stop reason: SDUPTHER

## 2020-05-04 RX ORDER — FENTANYL 100 UG/H
1 PATCH TRANSDERMAL
COMMUNITY
Start: 2012-09-12 | End: 2020-05-04

## 2020-05-29 ENCOUNTER — TELEPHONE (OUTPATIENT)
Dept: FAMILY MEDICINE CLINIC | Facility: CLINIC | Age: 50
End: 2020-05-29

## 2020-06-08 DIAGNOSIS — R52 PAIN: ICD-10-CM

## 2020-06-08 DIAGNOSIS — F32.A DEPRESSION, UNSPECIFIED DEPRESSION TYPE: ICD-10-CM

## 2020-06-08 RX ORDER — ALPRAZOLAM 0.5 MG/1
0.5 TABLET ORAL 2 TIMES DAILY
Qty: 180 TABLET | Refills: 0 | Status: SHIPPED | OUTPATIENT
Start: 2020-06-08 | End: 2020-08-31 | Stop reason: SDUPTHER

## 2020-06-08 RX ORDER — CITALOPRAM 20 MG/1
20 TABLET ORAL DAILY
Qty: 90 TABLET | Refills: 0 | Status: SHIPPED | OUTPATIENT
Start: 2020-06-08 | End: 2020-08-24 | Stop reason: SDUPTHER

## 2020-08-24 DIAGNOSIS — F32.A DEPRESSION, UNSPECIFIED DEPRESSION TYPE: ICD-10-CM

## 2020-08-24 RX ORDER — CITALOPRAM 20 MG/1
20 TABLET ORAL DAILY
Qty: 90 TABLET | Refills: 0 | Status: SHIPPED | OUTPATIENT
Start: 2020-08-24 | End: 2020-12-01 | Stop reason: SDUPTHER

## 2020-08-31 DIAGNOSIS — R52 PAIN: ICD-10-CM

## 2020-09-01 RX ORDER — ALPRAZOLAM 0.5 MG/1
0.5 TABLET ORAL 2 TIMES DAILY
Qty: 180 TABLET | Refills: 0 | Status: SHIPPED | OUTPATIENT
Start: 2020-09-01 | End: 2021-01-21 | Stop reason: SDUPTHER

## 2020-10-27 DIAGNOSIS — E03.9 HYPOTHYROIDISM, UNSPECIFIED TYPE: ICD-10-CM

## 2020-10-27 DIAGNOSIS — R10.13 DYSPEPSIA: ICD-10-CM

## 2020-10-27 RX ORDER — LEVOTHYROXINE SODIUM 0.07 MG/1
75 TABLET ORAL DAILY
Qty: 90 TABLET | Refills: 3 | Status: CANCELLED | OUTPATIENT
Start: 2020-10-27

## 2020-10-27 RX ORDER — LANSOPRAZOLE 30 MG/1
30 CAPSULE, DELAYED RELEASE ORAL DAILY
Qty: 90 CAPSULE | Refills: 0 | Status: CANCELLED | OUTPATIENT
Start: 2020-10-27

## 2020-10-27 RX ORDER — DULAGLUTIDE 0.75 MG/.5ML
INJECTION, SOLUTION SUBCUTANEOUS
Status: CANCELLED | OUTPATIENT
Start: 2020-10-27

## 2020-10-29 DIAGNOSIS — E03.9 HYPOTHYROIDISM, UNSPECIFIED TYPE: ICD-10-CM

## 2020-10-29 DIAGNOSIS — E11.9 TYPE 2 DIABETES MELLITUS WITHOUT COMPLICATION, WITH LONG-TERM CURRENT USE OF INSULIN (HCC): Primary | ICD-10-CM

## 2020-10-29 DIAGNOSIS — R52 PAIN: ICD-10-CM

## 2020-10-29 DIAGNOSIS — Z79.4 TYPE 2 DIABETES MELLITUS WITHOUT COMPLICATION, WITH LONG-TERM CURRENT USE OF INSULIN (HCC): Primary | ICD-10-CM

## 2020-10-29 DIAGNOSIS — R10.13 DYSPEPSIA: ICD-10-CM

## 2020-10-30 RX ORDER — LEVOTHYROXINE SODIUM 0.07 MG/1
75 TABLET ORAL DAILY
Qty: 90 TABLET | Refills: 3 | Status: SHIPPED | OUTPATIENT
Start: 2020-10-30 | End: 2020-12-15 | Stop reason: SDUPTHER

## 2020-10-30 RX ORDER — DULAGLUTIDE 0.75 MG/.5ML
0.75 INJECTION, SOLUTION SUBCUTANEOUS WEEKLY
Qty: 12 PEN | Refills: 1 | Status: SHIPPED | OUTPATIENT
Start: 2020-10-30 | End: 2021-04-12

## 2020-10-30 RX ORDER — LANSOPRAZOLE 30 MG/1
30 CAPSULE, DELAYED RELEASE ORAL DAILY
Qty: 90 CAPSULE | Refills: 0 | Status: SHIPPED | OUTPATIENT
Start: 2020-10-30 | End: 2020-12-21

## 2020-12-01 DIAGNOSIS — F32.A DEPRESSION, UNSPECIFIED DEPRESSION TYPE: ICD-10-CM

## 2020-12-01 RX ORDER — CITALOPRAM 20 MG/1
20 TABLET ORAL DAILY
Qty: 90 TABLET | Refills: 0 | Status: SHIPPED | OUTPATIENT
Start: 2020-12-01 | End: 2021-04-16 | Stop reason: SDUPTHER

## 2020-12-15 DIAGNOSIS — E03.9 HYPOTHYROIDISM, UNSPECIFIED TYPE: ICD-10-CM

## 2020-12-15 RX ORDER — LEVOTHYROXINE SODIUM 0.07 MG/1
75 TABLET ORAL DAILY
Qty: 90 TABLET | Refills: 3 | Status: SHIPPED | OUTPATIENT
Start: 2020-12-15 | End: 2021-06-08 | Stop reason: SDUPTHER

## 2020-12-20 DIAGNOSIS — R10.13 DYSPEPSIA: ICD-10-CM

## 2020-12-21 RX ORDER — LANSOPRAZOLE 30 MG/1
CAPSULE, DELAYED RELEASE ORAL
Qty: 90 CAPSULE | Refills: 0 | Status: SHIPPED | OUTPATIENT
Start: 2020-12-21 | End: 2021-04-06

## 2021-01-21 DIAGNOSIS — R52 PAIN: ICD-10-CM

## 2021-01-21 RX ORDER — ALPRAZOLAM 0.5 MG/1
0.5 TABLET ORAL 2 TIMES DAILY
Qty: 180 TABLET | Refills: 0 | Status: SHIPPED | OUTPATIENT
Start: 2021-01-21 | End: 2021-04-16 | Stop reason: SDUPTHER

## 2021-04-04 DIAGNOSIS — R10.13 DYSPEPSIA: ICD-10-CM

## 2021-04-06 RX ORDER — LANSOPRAZOLE 30 MG/1
CAPSULE, DELAYED RELEASE ORAL
Qty: 90 CAPSULE | Refills: 0 | Status: SHIPPED | OUTPATIENT
Start: 2021-04-06 | End: 2021-07-19

## 2021-04-11 DIAGNOSIS — Z79.4 TYPE 2 DIABETES MELLITUS WITHOUT COMPLICATION, WITH LONG-TERM CURRENT USE OF INSULIN (HCC): ICD-10-CM

## 2021-04-11 DIAGNOSIS — E11.9 TYPE 2 DIABETES MELLITUS WITHOUT COMPLICATION, WITH LONG-TERM CURRENT USE OF INSULIN (HCC): ICD-10-CM

## 2021-04-12 RX ORDER — DULAGLUTIDE 0.75 MG/.5ML
0.75 INJECTION, SOLUTION SUBCUTANEOUS WEEKLY
Qty: 5 PEN | Refills: 6 | Status: SHIPPED | OUTPATIENT
Start: 2021-04-12 | End: 2021-10-04

## 2021-04-16 DIAGNOSIS — R52 PAIN: ICD-10-CM

## 2021-04-16 DIAGNOSIS — F32.A DEPRESSION, UNSPECIFIED DEPRESSION TYPE: ICD-10-CM

## 2021-04-16 RX ORDER — ALPRAZOLAM 0.5 MG/1
0.5 TABLET ORAL 2 TIMES DAILY
Qty: 60 TABLET | Refills: 0 | Status: SHIPPED | OUTPATIENT
Start: 2021-04-16 | End: 2021-11-18 | Stop reason: SDUPTHER

## 2021-04-16 RX ORDER — CITALOPRAM 20 MG/1
20 TABLET ORAL DAILY
Qty: 90 TABLET | Refills: 1 | Status: SHIPPED | OUTPATIENT
Start: 2021-04-16 | End: 2021-12-01 | Stop reason: SDUPTHER

## 2021-04-16 RX ORDER — CITALOPRAM 20 MG/1
TABLET ORAL
Qty: 90 TABLET | Refills: 0 | OUTPATIENT
Start: 2021-04-16

## 2021-04-16 NOTE — TELEPHONE ENCOUNTER
PT CALLED FOR A REFILL OF HIS [place] [facility]  HE SCHEDULED AN APPT FOR FIRST WEEK OF MAY  CAN HE HAVE A REFILL NOW? IN SYSTEM FOR APPROVAL  THANK YOU

## 2021-04-19 DIAGNOSIS — R52 PAIN: ICD-10-CM

## 2021-04-19 RX ORDER — ALPRAZOLAM 0.5 MG/1
0.5 TABLET ORAL 2 TIMES DAILY
Qty: 60 TABLET | Refills: 0 | Status: CANCELLED | OUTPATIENT
Start: 2021-04-19

## 2021-04-19 NOTE — TELEPHONE ENCOUNTER
Patient's wife Jackie Gandhi called on 4/16/21 after hours  She stated that her  is going on a business trip and needs to get his Alprazolam 0 5 mg 1 tablet 2 times daily  Jackie Gandhi would like to have the medication sent in early and pay cash for it  Please advise on this

## 2021-06-08 DIAGNOSIS — E03.9 HYPOTHYROIDISM, UNSPECIFIED TYPE: ICD-10-CM

## 2021-06-08 RX ORDER — LEVOTHYROXINE SODIUM 0.07 MG/1
75 TABLET ORAL DAILY
Qty: 90 TABLET | Refills: 3 | Status: SHIPPED | OUTPATIENT
Start: 2021-06-08 | End: 2021-12-01 | Stop reason: SDUPTHER

## 2021-07-18 DIAGNOSIS — R10.13 DYSPEPSIA: ICD-10-CM

## 2021-07-19 RX ORDER — LANSOPRAZOLE 30 MG/1
CAPSULE, DELAYED RELEASE ORAL
Qty: 90 CAPSULE | Refills: 0 | Status: SHIPPED | OUTPATIENT
Start: 2021-07-19 | End: 2021-11-18 | Stop reason: SDUPTHER

## 2021-10-01 DIAGNOSIS — Z79.4 TYPE 2 DIABETES MELLITUS WITHOUT COMPLICATION, WITH LONG-TERM CURRENT USE OF INSULIN (HCC): ICD-10-CM

## 2021-10-01 DIAGNOSIS — E11.9 TYPE 2 DIABETES MELLITUS WITHOUT COMPLICATION, WITH LONG-TERM CURRENT USE OF INSULIN (HCC): ICD-10-CM

## 2021-10-04 RX ORDER — DULAGLUTIDE 0.75 MG/.5ML
0.75 INJECTION, SOLUTION SUBCUTANEOUS WEEKLY
Qty: 0.5 ML | Refills: 6 | Status: SHIPPED | OUTPATIENT
Start: 2021-10-04 | End: 2021-10-22

## 2021-10-22 DIAGNOSIS — Z79.4 TYPE 2 DIABETES MELLITUS WITHOUT COMPLICATION, WITH LONG-TERM CURRENT USE OF INSULIN (HCC): ICD-10-CM

## 2021-10-22 DIAGNOSIS — E11.9 TYPE 2 DIABETES MELLITUS WITHOUT COMPLICATION, WITH LONG-TERM CURRENT USE OF INSULIN (HCC): ICD-10-CM

## 2021-10-22 RX ORDER — DULAGLUTIDE 0.75 MG/.5ML
0.75 INJECTION, SOLUTION SUBCUTANEOUS WEEKLY
Qty: 0.5 ML | Refills: 3 | Status: SHIPPED | OUTPATIENT
Start: 2021-10-22 | End: 2022-05-12 | Stop reason: SDUPTHER

## 2021-11-18 DIAGNOSIS — R10.13 DYSPEPSIA: ICD-10-CM

## 2021-11-18 DIAGNOSIS — R52 PAIN: ICD-10-CM

## 2021-11-18 RX ORDER — LANSOPRAZOLE 30 MG/1
30 CAPSULE, DELAYED RELEASE ORAL DAILY
Qty: 90 CAPSULE | Refills: 0 | Status: SHIPPED | OUTPATIENT
Start: 2021-11-18 | End: 2022-01-27 | Stop reason: SDUPTHER

## 2021-11-18 RX ORDER — ALPRAZOLAM 0.5 MG/1
0.5 TABLET ORAL 2 TIMES DAILY
Qty: 60 TABLET | Refills: 0 | Status: SHIPPED | OUTPATIENT
Start: 2021-11-18 | End: 2022-01-27 | Stop reason: SDUPTHER

## 2021-12-01 DIAGNOSIS — F32.A DEPRESSION, UNSPECIFIED DEPRESSION TYPE: ICD-10-CM

## 2021-12-01 DIAGNOSIS — E03.9 HYPOTHYROIDISM, UNSPECIFIED TYPE: ICD-10-CM

## 2021-12-01 RX ORDER — CITALOPRAM 20 MG/1
20 TABLET ORAL DAILY
Qty: 90 TABLET | Refills: 1 | Status: SHIPPED | OUTPATIENT
Start: 2021-12-01 | End: 2022-02-25 | Stop reason: SDUPTHER

## 2021-12-01 RX ORDER — LEVOTHYROXINE SODIUM 0.07 MG/1
75 TABLET ORAL DAILY
Qty: 90 TABLET | Refills: 3 | Status: SHIPPED | OUTPATIENT
Start: 2021-12-01 | End: 2022-02-25 | Stop reason: SDUPTHER

## 2022-01-27 DIAGNOSIS — R52 PAIN: ICD-10-CM

## 2022-01-27 DIAGNOSIS — R10.13 DYSPEPSIA: ICD-10-CM

## 2022-01-27 RX ORDER — LANSOPRAZOLE 30 MG/1
30 CAPSULE, DELAYED RELEASE ORAL DAILY
Qty: 90 CAPSULE | Refills: 0 | Status: SHIPPED | OUTPATIENT
Start: 2022-01-27 | End: 2022-04-25

## 2022-01-27 RX ORDER — ALPRAZOLAM 0.5 MG/1
0.5 TABLET ORAL 2 TIMES DAILY
Qty: 60 TABLET | Refills: 0 | Status: SHIPPED | OUTPATIENT
Start: 2022-01-27 | End: 2022-03-01 | Stop reason: SDUPTHER

## 2022-01-28 DIAGNOSIS — E11.9 TYPE 2 DIABETES MELLITUS WITHOUT COMPLICATION, WITH LONG-TERM CURRENT USE OF INSULIN (HCC): ICD-10-CM

## 2022-01-28 DIAGNOSIS — E55.9 VITAMIN D DEFICIENCY: ICD-10-CM

## 2022-01-28 DIAGNOSIS — E78.49 OTHER HYPERLIPIDEMIA: ICD-10-CM

## 2022-01-28 DIAGNOSIS — I10 BENIGN ESSENTIAL HYPERTENSION: Primary | ICD-10-CM

## 2022-01-28 DIAGNOSIS — Z79.4 TYPE 2 DIABETES MELLITUS WITHOUT COMPLICATION, WITH LONG-TERM CURRENT USE OF INSULIN (HCC): ICD-10-CM

## 2022-01-28 DIAGNOSIS — E03.9 HYPOTHYROIDISM, UNSPECIFIED TYPE: ICD-10-CM

## 2022-01-28 NOTE — TELEPHONE ENCOUNTER
Pt was scheduled for 02/18/22 labs were re-ordered that were previously in chart that were never done  Pt will have that done prior to appointment

## 2022-02-01 ENCOUNTER — TELEPHONE (OUTPATIENT)
Dept: FAMILY MEDICINE CLINIC | Facility: CLINIC | Age: 52
End: 2022-02-01

## 2022-02-17 LAB
25(OH)D3 SERPL-MCNC: 17 NG/ML (ref 30–100)
ALBUMIN SERPL-MCNC: 4.8 G/DL (ref 3.6–5.1)
ALBUMIN/GLOB SERPL: 1.7 (CALC) (ref 1–2.5)
ALP SERPL-CCNC: 65 U/L (ref 35–144)
ALT SERPL-CCNC: 28 U/L (ref 9–46)
AST SERPL-CCNC: 16 U/L (ref 10–35)
BASOPHILS # BLD AUTO: 42 CELLS/UL (ref 0–200)
BASOPHILS NFR BLD AUTO: 0.5 %
BILIRUB SERPL-MCNC: 0.5 MG/DL (ref 0.2–1.2)
BUN SERPL-MCNC: 20 MG/DL (ref 7–25)
BUN/CREAT SERPL: NORMAL (CALC) (ref 6–22)
CALCIUM SERPL-MCNC: 9.7 MG/DL (ref 8.6–10.3)
CHLORIDE SERPL-SCNC: 103 MMOL/L (ref 98–110)
CHOLEST SERPL-MCNC: 221 MG/DL
CHOLEST/HDLC SERPL: 5.1 (CALC)
CO2 SERPL-SCNC: 28 MMOL/L (ref 20–32)
CREAT SERPL-MCNC: 1.14 MG/DL (ref 0.7–1.33)
EOSINOPHIL # BLD AUTO: 149 CELLS/UL (ref 15–500)
EOSINOPHIL NFR BLD AUTO: 1.8 %
ERYTHROCYTE [DISTWIDTH] IN BLOOD BY AUTOMATED COUNT: 11.8 % (ref 11–15)
GLOBULIN SER CALC-MCNC: 2.9 G/DL (CALC) (ref 1.9–3.7)
GLUCOSE SERPL-MCNC: 87 MG/DL (ref 65–99)
HCT VFR BLD AUTO: 44 % (ref 38.5–50)
HDLC SERPL-MCNC: 43 MG/DL
HGB BLD-MCNC: 15.8 G/DL (ref 13.2–17.1)
LDLC SERPL CALC-MCNC: 146 MG/DL (CALC)
LYMPHOCYTES # BLD AUTO: 1569 CELLS/UL (ref 850–3900)
LYMPHOCYTES NFR BLD AUTO: 18.9 %
MCH RBC QN AUTO: 33.8 PG (ref 27–33)
MCHC RBC AUTO-ENTMCNC: 35.9 G/DL (ref 32–36)
MCV RBC AUTO: 94.2 FL (ref 80–100)
MONOCYTES # BLD AUTO: 755 CELLS/UL (ref 200–950)
MONOCYTES NFR BLD AUTO: 9.1 %
NEUTROPHILS # BLD AUTO: 5785 CELLS/UL (ref 1500–7800)
NEUTROPHILS NFR BLD AUTO: 69.7 %
NONHDLC SERPL-MCNC: 178 MG/DL (CALC)
PLATELET # BLD AUTO: 262 THOUSAND/UL (ref 140–400)
PMV BLD REES-ECKER: 9.6 FL (ref 7.5–12.5)
POTASSIUM SERPL-SCNC: 4.2 MMOL/L (ref 3.5–5.3)
PROT SERPL-MCNC: 7.7 G/DL (ref 6.1–8.1)
RBC # BLD AUTO: 4.67 MILLION/UL (ref 4.2–5.8)
SL AMB EGFR AFRICAN AMERICAN: 85 ML/MIN/1.73M2
SL AMB EGFR NON AFRICAN AMERICAN: 74 ML/MIN/1.73M2
SODIUM SERPL-SCNC: 141 MMOL/L (ref 135–146)
TRIGL SERPL-MCNC: 187 MG/DL
TSH SERPL-ACNC: 1.79 MIU/L (ref 0.4–4.5)
WBC # BLD AUTO: 8.3 THOUSAND/UL (ref 3.8–10.8)

## 2022-02-25 ENCOUNTER — OFFICE VISIT (OUTPATIENT)
Dept: FAMILY MEDICINE CLINIC | Facility: CLINIC | Age: 52
End: 2022-02-25
Payer: COMMERCIAL

## 2022-02-25 VITALS
DIASTOLIC BLOOD PRESSURE: 104 MMHG | WEIGHT: 315 LBS | BODY MASS INDEX: 45.1 KG/M2 | SYSTOLIC BLOOD PRESSURE: 150 MMHG | TEMPERATURE: 96.9 F | HEART RATE: 66 BPM | OXYGEN SATURATION: 96 % | HEIGHT: 70 IN

## 2022-02-25 DIAGNOSIS — E55.9 VITAMIN D DEFICIENCY: ICD-10-CM

## 2022-02-25 DIAGNOSIS — R10.13 DYSPEPSIA: ICD-10-CM

## 2022-02-25 DIAGNOSIS — M12.9 ARTHROPATHY: ICD-10-CM

## 2022-02-25 DIAGNOSIS — I15.9 SECONDARY HYPERTENSION: ICD-10-CM

## 2022-02-25 DIAGNOSIS — R30.0 DYSURIA: Primary | ICD-10-CM

## 2022-02-25 DIAGNOSIS — Z91.89 10 YEAR RISK OF MI OR STROKE 7.5% OR GREATER: ICD-10-CM

## 2022-02-25 DIAGNOSIS — Z12.11 COLON CANCER SCREENING: ICD-10-CM

## 2022-02-25 DIAGNOSIS — R25.2 MUSCLE CRAMPS: ICD-10-CM

## 2022-02-25 DIAGNOSIS — E78.5 HYPERLIPIDEMIA, UNSPECIFIED HYPERLIPIDEMIA TYPE: ICD-10-CM

## 2022-02-25 DIAGNOSIS — E03.9 HYPOTHYROIDISM, UNSPECIFIED TYPE: ICD-10-CM

## 2022-02-25 DIAGNOSIS — R06.83 LOUD SNORING: ICD-10-CM

## 2022-02-25 DIAGNOSIS — E11.9 TYPE 2 DIABETES MELLITUS WITHOUT COMPLICATION, WITHOUT LONG-TERM CURRENT USE OF INSULIN (HCC): ICD-10-CM

## 2022-02-25 DIAGNOSIS — Z00.00 ANNUAL PHYSICAL EXAM: ICD-10-CM

## 2022-02-25 DIAGNOSIS — F32.A DEPRESSION, UNSPECIFIED DEPRESSION TYPE: ICD-10-CM

## 2022-02-25 PROBLEM — Z79.4 TYPE 2 DIABETES MELLITUS WITHOUT COMPLICATION, WITH LONG-TERM CURRENT USE OF INSULIN (HCC): Status: ACTIVE | Noted: 2022-02-25

## 2022-02-25 LAB
CREAT UR-MCNC: 202 MG/DL
MICROALBUMIN UR-MCNC: 36.1 MG/L (ref 0–20)
MICROALBUMIN/CREAT 24H UR: 18 MG/G CREATININE (ref 0–30)
SL AMB POCT HEMOGLOBIN AIC: 5.3 (ref ?–6.5)

## 2022-02-25 PROCEDURE — 83036 HEMOGLOBIN GLYCOSYLATED A1C: CPT | Performed by: INTERNAL MEDICINE

## 2022-02-25 PROCEDURE — 3060F POS MICROALBUMINURIA REV: CPT | Performed by: INTERNAL MEDICINE

## 2022-02-25 PROCEDURE — 82043 UR ALBUMIN QUANTITATIVE: CPT | Performed by: INTERNAL MEDICINE

## 2022-02-25 PROCEDURE — 82570 ASSAY OF URINE CREATININE: CPT | Performed by: INTERNAL MEDICINE

## 2022-02-25 PROCEDURE — 99396 PREV VISIT EST AGE 40-64: CPT | Performed by: INTERNAL MEDICINE

## 2022-02-25 PROCEDURE — 1036F TOBACCO NON-USER: CPT | Performed by: INTERNAL MEDICINE

## 2022-02-25 PROCEDURE — 3725F SCREEN DEPRESSION PERFORMED: CPT | Performed by: INTERNAL MEDICINE

## 2022-02-25 PROCEDURE — 4010F ACE/ARB THERAPY RXD/TAKEN: CPT | Performed by: INTERNAL MEDICINE

## 2022-02-25 PROCEDURE — 3044F HG A1C LEVEL LT 7.0%: CPT | Performed by: INTERNAL MEDICINE

## 2022-02-25 PROCEDURE — 3008F BODY MASS INDEX DOCD: CPT | Performed by: INTERNAL MEDICINE

## 2022-02-25 RX ORDER — ROSUVASTATIN CALCIUM 20 MG/1
20 TABLET, COATED ORAL DAILY
Qty: 30 TABLET | Refills: 5 | Status: SHIPPED | OUTPATIENT
Start: 2022-02-25 | End: 2022-03-28 | Stop reason: SDUPTHER

## 2022-02-25 RX ORDER — LEVOTHYROXINE SODIUM 0.07 MG/1
75 TABLET ORAL DAILY
Qty: 90 TABLET | Refills: 3 | Status: SHIPPED | OUTPATIENT
Start: 2022-02-25 | End: 2022-05-24 | Stop reason: SDUPTHER

## 2022-02-25 RX ORDER — CITALOPRAM 20 MG/1
20 TABLET ORAL DAILY
Qty: 90 TABLET | Refills: 1 | Status: SHIPPED | OUTPATIENT
Start: 2022-02-25 | End: 2022-05-24 | Stop reason: SDUPTHER

## 2022-02-25 RX ORDER — ERGOCALCIFEROL 1.25 MG/1
50000 CAPSULE ORAL WEEKLY
Qty: 8 CAPSULE | Refills: 0 | Status: SHIPPED | OUTPATIENT
Start: 2022-02-25 | End: 2022-04-25

## 2022-02-25 RX ORDER — QUINAPRIL 20 MG/1
20 TABLET ORAL DAILY
Qty: 90 TABLET | Refills: 1 | Status: SHIPPED | OUTPATIENT
Start: 2022-02-25 | End: 2022-05-24 | Stop reason: SDUPTHER

## 2022-02-25 NOTE — ASSESSMENT & PLAN NOTE
Lab Results   Component Value Date    HGBA1C 5 3 02/25/2022   Excellent blood sugars control continue current regimen

## 2022-02-25 NOTE — PROGRESS NOTES
Assessment/Plan:    Type 2 diabetes mellitus without complication, without long-term current use of insulin (HCC)    Lab Results   Component Value Date    HGBA1C 5 3 02/25/2022   Excellent blood sugars control continue current regimen         Problem List Items Addressed This Visit        Endocrine    Hypothyroidism    Relevant Medications    levothyroxine 75 mcg tablet    Type 2 diabetes mellitus without complication, without long-term current use of insulin (HCC)       Lab Results   Component Value Date    HGBA1C 5 3 02/25/2022   Excellent blood sugars control continue current regimen            Other    Hyperlipemia    Relevant Medications    rosuvastatin (CRESTOR) 20 MG tablet    Other Relevant Orders    Lipid panel    Vitamin D deficiency    Relevant Medications    ergocalciferol (VITAMIN D2) 50,000 units      Other Visit Diagnoses     Dysuria    -  Primary    Relevant Orders    POCT hemoglobin A1c (Completed)    Annual physical exam        Dyspepsia        Colon cancer screening        Relevant Orders    Ambulatory Referral to Gastroenterology    Loud snoring        Relevant Orders    Ambulatory Referral to Sleep Medicine    Arthropathy        Relevant Orders    Uric acid    Celiac Antibodies Profile    Depression, unspecified depression type        Relevant Medications    citalopram (CeleXA) 20 mg tablet    Secondary hypertension        Relevant Medications    quinapril (ACCUPRIL) 20 mg tablet    Muscle cramps        Relevant Orders    Magnesium    Vitamin B12    10 year risk of MI or stroke 7 5% or greater                Subjective:      Patient ID: Haider Chávez is a 45 y o  male  HPI  Patient is here for annual physical   I have not seen the patient for few years now  He recently moved to [place] but wishes to continue his care with us  Patient reports his blood sugars been running around 100-110 episodes of hypoglycemia  He has stop taking metformin because of diarrhea  He is only on Trulicity  His hemoglobin A1c office was 5 3  Patient is up-to-date with eye exam   Denies any numbness and tingling in his feet  Unfortunately, his blood pressure is on the higher side today  Denies any chest pain shortness of breath lightheadedness  He has been working on [employer] and does not have any cardiac symptoms  He shares that he has not been taking his blood pressure medication for long time now  We will resume his quinapril  His wife will monitor his blood pressure  He will keep me posted about his blood pressure  I wanted to be less than 140  Sleep study would also be ordered  Recent lab studies were discussed with patient which are concerning for high LDL  His 10 year risk of major atherosclerotic event is elevated at 15 9%  trouble  I will start him on Crestor  Patient recalls being on simvastatin and experiencing muscle aches and pains  Would try Crestor  Follow-up lab studies in 6 weeks  Patient has been experiencing more arthralgia in his feet that would last for several weeks and would resolve on its own  He does see some association with food intake  I will check celiac antibody as well as gout  Patient is due for colonoscopy  I would also suggest getting an EGD  Patient would arrange for these appointments in [place]  He reports his depression anxiety to be under good control on current regimen      The following portions of the patient's history were reviewed and updated as appropriate: allergies, current medications, past family history, past medical history, past social history, past surgical history and problem list     Review of Systems      Objective:      BP (!) 150/104 (BP Location: Left arm, Patient Position: Sitting, Cuff Size: Standard)   Pulse 66   Temp (!) 96 9 °F (36 1 °C)   Ht 5' 10" (1 778 m)   Wt (!) 155 kg (341 lb)   SpO2 96%   BMI 48 93 kg/m²          Physical Exam  Eyes:      Conjunctiva/sclera: Conjunctivae normal    Neck:      Vascular: No carotid bruit  Cardiovascular:      Rate and Rhythm: Normal rate and regular rhythm  Pulses: no weak pulses          Dorsalis pedis pulses are 1+ on the right side and 1+ on the left side  Heart sounds: Normal heart sounds  No murmur heard  Pulmonary:      Effort: Pulmonary effort is normal  No respiratory distress  Breath sounds: Normal breath sounds  No wheezing or rales  Abdominal:      General: Bowel sounds are normal  There is no distension  Tenderness: There is no abdominal tenderness  There is no right CVA tenderness, left CVA tenderness or guarding  Musculoskeletal:      Right lower leg: No edema  Left lower leg: No edema  Feet:      Right foot:      Skin integrity: No ulcer, skin breakdown, erythema, warmth, callus or dry skin  Left foot:      Skin integrity: No ulcer, skin breakdown, erythema, warmth, callus or dry skin  Neurological:      General: No focal deficit present  Mental Status: He is alert and oriented to person, place, and time  Motor: No weakness  Gait: Gait normal    Psychiatric:         Mood and Affect: Mood normal          Patient's shoes and socks removed  Right Foot/Ankle   Right Foot Inspection  Skin Exam: skin normal and skin intact  No dry skin, no warmth, no callus, no erythema, no maceration, no abnormal color, no pre-ulcer, no ulcer and no callus  Sensory   Monofilament testing: intact    Vascular  The right DP pulse is 1+  Left Foot/Ankle  Left Foot Inspection  Skin Exam: skin normal and skin intact  No dry skin, no warmth, no erythema, no maceration, normal color, no pre-ulcer, no ulcer and no callus  Sensory   Monofilament testing: intact    Vascular  The left DP pulse is 1+       Assign Risk Category  No deformity present  No loss of protective sensation  No weak pulses  Risk: 0

## 2022-03-01 DIAGNOSIS — R52 PAIN: ICD-10-CM

## 2022-03-01 RX ORDER — ALPRAZOLAM 0.5 MG/1
0.5 TABLET ORAL 2 TIMES DAILY
Qty: 60 TABLET | Refills: 0 | Status: SHIPPED | OUTPATIENT
Start: 2022-03-01 | End: 2022-03-28 | Stop reason: SDUPTHER

## 2022-03-28 DIAGNOSIS — E78.5 HYPERLIPIDEMIA, UNSPECIFIED HYPERLIPIDEMIA TYPE: ICD-10-CM

## 2022-03-28 DIAGNOSIS — R52 PAIN: ICD-10-CM

## 2022-03-28 RX ORDER — ROSUVASTATIN CALCIUM 20 MG/1
20 TABLET, COATED ORAL DAILY
Qty: 30 TABLET | Refills: 0 | Status: SHIPPED | OUTPATIENT
Start: 2022-03-28

## 2022-03-28 RX ORDER — ALPRAZOLAM 0.5 MG/1
0.5 TABLET ORAL 2 TIMES DAILY
Qty: 60 TABLET | Refills: 0 | Status: SHIPPED | OUTPATIENT
Start: 2022-03-28 | End: 2022-04-25 | Stop reason: SDUPTHER

## 2022-04-25 DIAGNOSIS — R52 PAIN: ICD-10-CM

## 2022-04-25 DIAGNOSIS — R10.13 DYSPEPSIA: ICD-10-CM

## 2022-04-25 DIAGNOSIS — E55.9 VITAMIN D DEFICIENCY: ICD-10-CM

## 2022-04-25 RX ORDER — ALPRAZOLAM 0.5 MG/1
0.5 TABLET ORAL 2 TIMES DAILY
Qty: 60 TABLET | Refills: 0 | Status: SHIPPED | OUTPATIENT
Start: 2022-04-25 | End: 2022-05-24 | Stop reason: SDUPTHER

## 2022-04-25 RX ORDER — ERGOCALCIFEROL 1.25 MG/1
CAPSULE ORAL
Qty: 8 CAPSULE | Refills: 0 | Status: SHIPPED | OUTPATIENT
Start: 2022-04-25 | End: 2022-06-21

## 2022-04-25 RX ORDER — LANSOPRAZOLE 30 MG/1
CAPSULE, DELAYED RELEASE ORAL
Qty: 90 CAPSULE | Refills: 0 | Status: SHIPPED | OUTPATIENT
Start: 2022-04-25 | End: 2022-07-29

## 2022-05-12 DIAGNOSIS — E11.9 TYPE 2 DIABETES MELLITUS WITHOUT COMPLICATION, WITH LONG-TERM CURRENT USE OF INSULIN (HCC): ICD-10-CM

## 2022-05-12 DIAGNOSIS — Z79.4 TYPE 2 DIABETES MELLITUS WITHOUT COMPLICATION, WITH LONG-TERM CURRENT USE OF INSULIN (HCC): ICD-10-CM

## 2022-05-12 RX ORDER — DULAGLUTIDE 0.75 MG/.5ML
INJECTION, SOLUTION SUBCUTANEOUS
Qty: 0.5 ML | Refills: 6 | Status: SHIPPED | OUTPATIENT
Start: 2022-05-12 | End: 2022-07-07

## 2022-05-12 RX ORDER — DULAGLUTIDE 0.75 MG/.5ML
0.75 INJECTION, SOLUTION SUBCUTANEOUS WEEKLY
Qty: 0.5 ML | Refills: 0 | Status: SHIPPED | OUTPATIENT
Start: 2022-05-12 | End: 2022-05-12

## 2022-05-24 DIAGNOSIS — F32.A DEPRESSION, UNSPECIFIED DEPRESSION TYPE: ICD-10-CM

## 2022-05-24 DIAGNOSIS — E03.9 HYPOTHYROIDISM, UNSPECIFIED TYPE: ICD-10-CM

## 2022-05-24 DIAGNOSIS — I15.9 SECONDARY HYPERTENSION: ICD-10-CM

## 2022-05-24 DIAGNOSIS — R52 PAIN: ICD-10-CM

## 2022-05-24 RX ORDER — CITALOPRAM 20 MG/1
20 TABLET ORAL DAILY
Qty: 90 TABLET | Refills: 0 | Status: SHIPPED | OUTPATIENT
Start: 2022-05-24

## 2022-05-24 RX ORDER — LEVOTHYROXINE SODIUM 0.07 MG/1
75 TABLET ORAL DAILY
Qty: 90 TABLET | Refills: 0 | Status: SHIPPED | OUTPATIENT
Start: 2022-05-24

## 2022-05-24 RX ORDER — QUINAPRIL 20 MG/1
20 TABLET ORAL DAILY
Qty: 90 TABLET | Refills: 0 | Status: SHIPPED | OUTPATIENT
Start: 2022-05-24

## 2022-05-24 RX ORDER — ALPRAZOLAM 0.5 MG/1
0.5 TABLET ORAL 2 TIMES DAILY
Qty: 60 TABLET | Refills: 0 | Status: SHIPPED | OUTPATIENT
Start: 2022-05-24 | End: 2022-06-22 | Stop reason: SDUPTHER

## 2022-06-22 DIAGNOSIS — R52 PAIN: ICD-10-CM

## 2022-06-23 RX ORDER — ALPRAZOLAM 0.5 MG/1
0.5 TABLET ORAL 2 TIMES DAILY
Qty: 60 TABLET | Refills: 0 | Status: SHIPPED | OUTPATIENT
Start: 2022-06-23 | End: 2022-08-04 | Stop reason: SDUPTHER

## 2022-06-23 NOTE — TELEPHONE ENCOUNTER
Summary    Summary  Total Prescriptions:    9    Total Prescribers:    1    Total Pharmacies:    2    Narcotics* (excluding Buprenorphine)  Current Qty:   0   Current MME/day:   0 00   30 Day Avg MME/day:   0 00   Buprenorphine*  Current Qty:   0   Current mg/day:   0 00   30 Day Avg mg/day:   0 00   Prescriptions    *Highlighted drugs could not be included in score calculations   Prescriptions  Total Prescriptions: 9    Total Private Pay: 0    Fill Date ID   Written Drug Qty Days Prescriber Rx # Pharmacy Refill   Daily Dose* Pymt Type      05/24/2022  1   05/24/2022  Alprazolam 0 5 MG Tablet    60 00  30 Ni Mateus   [id number]   Mar (1233)   0   -   MD   04/26/2022  1   04/25/2022  Alprazolam 0 5 MG Tablet    60 00  30 Ni Mateus   [id number]   Mar (1233)   0   -   MD   03/29/2022  1   03/28/2022  Alprazolam 0 5 MG Tablet    60 00  30 Ni Mateus   [id number]   Mar (1233)   0   -   MD   03/01/2022  1   03/01/2022  Alprazolam 0 5 MG Tablet    60 00  30 Ni Mateus   [id number]   Mar (1233)   0   -   MD   01/27/2022  2   01/27/2022  Alprazolam 0 5 MG Tablet    60 00  30 Ni Mateus

## 2022-07-07 DIAGNOSIS — Z79.4 TYPE 2 DIABETES MELLITUS WITHOUT COMPLICATION, WITH LONG-TERM CURRENT USE OF INSULIN (HCC): ICD-10-CM

## 2022-07-07 DIAGNOSIS — E11.9 TYPE 2 DIABETES MELLITUS WITHOUT COMPLICATION, WITH LONG-TERM CURRENT USE OF INSULIN (HCC): ICD-10-CM

## 2022-07-07 RX ORDER — DULAGLUTIDE 0.75 MG/.5ML
INJECTION, SOLUTION SUBCUTANEOUS
Qty: 0.5 ML | Refills: 3 | Status: SHIPPED | OUTPATIENT
Start: 2022-07-07

## 2022-07-29 DIAGNOSIS — R10.13 DYSPEPSIA: ICD-10-CM

## 2022-07-29 RX ORDER — LANSOPRAZOLE 30 MG/1
CAPSULE, DELAYED RELEASE ORAL
Qty: 90 CAPSULE | Refills: 0 | Status: SHIPPED | OUTPATIENT
Start: 2022-07-29

## 2022-08-04 DIAGNOSIS — R52 PAIN: ICD-10-CM

## 2022-08-04 RX ORDER — ALPRAZOLAM 0.5 MG/1
0.5 TABLET ORAL 2 TIMES DAILY
Qty: 60 TABLET | Refills: 0 | Status: SHIPPED | OUTPATIENT
Start: 2022-08-04 | End: 2022-09-14 | Stop reason: SDUPTHER

## 2022-08-14 DIAGNOSIS — E55.9 VITAMIN D DEFICIENCY: ICD-10-CM

## 2022-08-15 DIAGNOSIS — I15.9 SECONDARY HYPERTENSION: ICD-10-CM

## 2022-08-15 DIAGNOSIS — E55.9 VITAMIN D DEFICIENCY: ICD-10-CM

## 2022-08-15 RX ORDER — ERGOCALCIFEROL 1.25 MG/1
CAPSULE ORAL
Qty: 8 CAPSULE | Refills: 0 | Status: SHIPPED | OUTPATIENT
Start: 2022-08-15 | End: 2022-08-15 | Stop reason: SDUPTHER

## 2022-08-15 RX ORDER — QUINAPRIL 20 MG/1
20 TABLET ORAL DAILY
Qty: 90 TABLET | Refills: 0 | Status: SHIPPED | OUTPATIENT
Start: 2022-08-15 | End: 2022-09-09

## 2022-08-15 RX ORDER — ERGOCALCIFEROL 1.25 MG/1
50000 CAPSULE ORAL WEEKLY
Qty: 8 CAPSULE | Refills: 0 | Status: SHIPPED | OUTPATIENT
Start: 2022-08-15 | End: 2022-10-18

## 2022-08-21 DIAGNOSIS — E03.9 HYPOTHYROIDISM, UNSPECIFIED TYPE: ICD-10-CM

## 2022-08-21 DIAGNOSIS — F32.A DEPRESSION, UNSPECIFIED DEPRESSION TYPE: ICD-10-CM

## 2022-08-22 RX ORDER — CITALOPRAM 20 MG/1
TABLET ORAL
Qty: 90 TABLET | Refills: 0 | Status: SHIPPED | OUTPATIENT
Start: 2022-08-22

## 2022-08-22 RX ORDER — LEVOTHYROXINE SODIUM 0.07 MG/1
TABLET ORAL
Qty: 90 TABLET | Refills: 0 | Status: SHIPPED | OUTPATIENT
Start: 2022-08-22

## 2022-09-09 DIAGNOSIS — I15.9 SECONDARY HYPERTENSION: ICD-10-CM

## 2022-09-09 RX ORDER — QUINAPRIL 20 MG/1
TABLET ORAL
Qty: 90 TABLET | Refills: 0 | Status: SHIPPED | OUTPATIENT
Start: 2022-09-09

## 2022-09-14 DIAGNOSIS — R52 PAIN: ICD-10-CM

## 2022-09-16 RX ORDER — ALPRAZOLAM 0.5 MG/1
0.5 TABLET ORAL 2 TIMES DAILY
Qty: 60 TABLET | Refills: 0 | Status: SHIPPED | OUTPATIENT
Start: 2022-09-16 | End: 2022-10-18 | Stop reason: SDUPTHER

## 2022-10-18 DIAGNOSIS — R52 PAIN: ICD-10-CM

## 2022-10-18 DIAGNOSIS — E55.9 VITAMIN D DEFICIENCY: ICD-10-CM

## 2022-10-18 RX ORDER — ERGOCALCIFEROL 1.25 MG/1
CAPSULE ORAL
Qty: 8 CAPSULE | Refills: 0 | Status: SHIPPED | OUTPATIENT
Start: 2022-10-18

## 2022-10-18 RX ORDER — ALPRAZOLAM 0.5 MG/1
0.5 TABLET ORAL 2 TIMES DAILY
Qty: 60 TABLET | Refills: 0 | Status: SHIPPED | OUTPATIENT
Start: 2022-10-18

## 2022-12-10 DIAGNOSIS — E55.9 VITAMIN D DEFICIENCY: ICD-10-CM

## 2022-12-12 RX ORDER — ERGOCALCIFEROL 1.25 MG/1
CAPSULE ORAL
Qty: 8 CAPSULE | Refills: 0 | Status: SHIPPED | OUTPATIENT
Start: 2022-12-12

## 2022-12-13 DIAGNOSIS — E03.9 HYPOTHYROIDISM, UNSPECIFIED TYPE: ICD-10-CM

## 2022-12-13 DIAGNOSIS — F32.A DEPRESSION, UNSPECIFIED DEPRESSION TYPE: ICD-10-CM

## 2022-12-13 DIAGNOSIS — R52 PAIN: ICD-10-CM

## 2022-12-13 RX ORDER — LEVOTHYROXINE SODIUM 0.07 MG/1
75 TABLET ORAL EVERY MORNING
Qty: 30 TABLET | Refills: 1 | Status: SHIPPED | OUTPATIENT
Start: 2022-12-13

## 2022-12-13 RX ORDER — ALPRAZOLAM 0.5 MG/1
0.5 TABLET ORAL 2 TIMES DAILY
Qty: 60 TABLET | Refills: 0 | Status: SHIPPED | OUTPATIENT
Start: 2022-12-13

## 2022-12-13 RX ORDER — CITALOPRAM 20 MG/1
20 TABLET ORAL EVERY MORNING
Qty: 30 TABLET | Refills: 1 | Status: SHIPPED | OUTPATIENT
Start: 2022-12-13

## 2022-12-13 NOTE — TELEPHONE ENCOUNTER
Total Private Pay  0    Total Prescribers  2    Total Pharmacies  2    Opioids* (excluding Buprenorphine)  Current Qty  0    Current MME/day  0 00    30 Day Avg MME/day  0 00    Buprenorphine*  Current Qty  0    Current mg/day  0 00    30 Day Avg mg/day  0 00      Tile cannot be moved due to a restriction by the Admin  Prescriptions   Total: 13   Private Pay: 0   Showing 1-13 of 13 Items   View   15 Items    1 of 1   Filled  Written  Sold  ID  Drug  QTY  Days  Prescriber  RX #  Dispenser  Refill  Daily Dose*  Pymt Type       11/16/2022 11/16/2022   1  Alprazolam 0 5 Mg Tablet 60 00  30  Ni Mateus  [id number]   Mar (1233)  0  2 00 LME  -  MD     10/18/2022  10/18/2022   1  Alprazolam 0 5 Mg Tablet 60 00  30  Ni Mateus  [id number]   Mar (1233)  0  2 00 LME  -  MD     09/16/2022 09/16/2022   1  Alprazolam 0 5 Mg Tablet 60 00  30  Ni Mateus  [id number]   Mar (1233)  0  2 00 LME  -  MD     08/12/2022 08/04/2022   1  Alprazolam 0 5 Mg Tablet 60 00  30  Ni Mateus  [id number]   Mar (1233)  0  2 00 LME  -  MD     06/23/2022 06/23/2022   1  Alprazolam 0 5 Mg Tablet 60 00  30  Ta Fel  [id number]   Mar (1233)  0  2 00 LME  -  MD     05/24/2022 05/24/2022   1  Alprazolam 0 5 Mg Tablet 60 00  30  Ni Mateus  [id number]   Mar (1233)  0  2 00 LME  -  MD     04/26/2022 04/25/2022   1  Alprazolam 0 5 Mg Tablet 60 00  30  Ni Mateus  [id number]   Mar (1233)  0  2 00 LME  -  MD     03/29/2022 03/28/2022   1  Alprazolam 0 5 Mg Tablet 60 00  30  Ni Chester Porter  [id number]   Mar ([id number])

## 2023-01-19 DIAGNOSIS — R52 PAIN: ICD-10-CM

## 2023-01-19 DIAGNOSIS — F32.A DEPRESSION, UNSPECIFIED DEPRESSION TYPE: ICD-10-CM

## 2023-01-19 RX ORDER — ALPRAZOLAM 0.5 MG/1
0.5 TABLET ORAL 2 TIMES DAILY
Qty: 60 TABLET | Refills: 0 | Status: SHIPPED | OUTPATIENT
Start: 2023-01-19

## 2023-01-19 RX ORDER — CITALOPRAM 20 MG/1
20 TABLET ORAL EVERY MORNING
Qty: 30 TABLET | Refills: 0 | Status: SHIPPED | OUTPATIENT
Start: 2023-01-19

## 2023-01-23 ENCOUNTER — TELEPHONE (OUTPATIENT)
Dept: FAMILY MEDICINE CLINIC | Facility: CLINIC | Age: 53
End: 2023-01-23

## 2023-02-06 DIAGNOSIS — E55.9 VITAMIN D DEFICIENCY: ICD-10-CM

## 2023-02-06 RX ORDER — ERGOCALCIFEROL 1.25 MG/1
CAPSULE ORAL
Qty: 8 CAPSULE | Refills: 0 | Status: SHIPPED | OUTPATIENT
Start: 2023-02-06

## 2023-02-11 DIAGNOSIS — I15.9 SECONDARY HYPERTENSION: ICD-10-CM

## 2023-02-13 RX ORDER — QUINAPRIL 20 MG/1
TABLET ORAL
Qty: 90 TABLET | Refills: 0 | Status: SHIPPED | OUTPATIENT
Start: 2023-02-13

## 2023-02-15 DIAGNOSIS — R10.13 DYSPEPSIA: ICD-10-CM

## 2023-02-15 DIAGNOSIS — E03.9 HYPOTHYROIDISM, UNSPECIFIED TYPE: ICD-10-CM

## 2023-02-15 RX ORDER — LEVOTHYROXINE SODIUM 0.07 MG/1
TABLET ORAL
Qty: 30 TABLET | Refills: 1 | Status: SHIPPED | OUTPATIENT
Start: 2023-02-15

## 2023-02-15 RX ORDER — LANSOPRAZOLE 30 MG/1
30 CAPSULE, DELAYED RELEASE ORAL DAILY
Qty: 90 CAPSULE | Refills: 0 | Status: SHIPPED | OUTPATIENT
Start: 2023-02-15

## 2023-02-22 DIAGNOSIS — Z79.4 TYPE 2 DIABETES MELLITUS WITHOUT COMPLICATION, WITH LONG-TERM CURRENT USE OF INSULIN (HCC): ICD-10-CM

## 2023-02-22 DIAGNOSIS — F32.A DEPRESSION, UNSPECIFIED DEPRESSION TYPE: ICD-10-CM

## 2023-02-22 DIAGNOSIS — R52 PAIN: ICD-10-CM

## 2023-02-22 DIAGNOSIS — E11.9 TYPE 2 DIABETES MELLITUS WITHOUT COMPLICATION, WITH LONG-TERM CURRENT USE OF INSULIN (HCC): ICD-10-CM

## 2023-02-23 RX ORDER — ALPRAZOLAM 0.5 MG/1
0.5 TABLET ORAL 2 TIMES DAILY
Qty: 60 TABLET | Refills: 0 | Status: SHIPPED | OUTPATIENT
Start: 2023-02-23

## 2023-02-23 RX ORDER — DULAGLUTIDE 0.75 MG/.5ML
0.75 INJECTION, SOLUTION SUBCUTANEOUS WEEKLY
Qty: 3 ML | Refills: 0 | Status: SHIPPED | OUTPATIENT
Start: 2023-02-23

## 2023-02-23 RX ORDER — CITALOPRAM 20 MG/1
20 TABLET ORAL EVERY MORNING
Qty: 30 TABLET | Refills: 0 | Status: SHIPPED | OUTPATIENT
Start: 2023-02-23

## 2023-04-28 DIAGNOSIS — F32.A DEPRESSION, UNSPECIFIED DEPRESSION TYPE: ICD-10-CM

## 2023-04-28 DIAGNOSIS — E03.9 HYPOTHYROIDISM, UNSPECIFIED TYPE: ICD-10-CM

## 2023-04-28 DIAGNOSIS — Z79.4 TYPE 2 DIABETES MELLITUS WITHOUT COMPLICATION, WITH LONG-TERM CURRENT USE OF INSULIN (HCC): ICD-10-CM

## 2023-04-28 DIAGNOSIS — E11.9 TYPE 2 DIABETES MELLITUS WITHOUT COMPLICATION, WITH LONG-TERM CURRENT USE OF INSULIN (HCC): ICD-10-CM

## 2023-04-28 RX ORDER — CITALOPRAM 20 MG/1
20 TABLET ORAL EVERY MORNING
Qty: 30 TABLET | Refills: 0 | Status: SHIPPED | OUTPATIENT
Start: 2023-04-28

## 2023-04-28 RX ORDER — DULAGLUTIDE 0.75 MG/.5ML
0.75 INJECTION, SOLUTION SUBCUTANEOUS
Qty: 3 ML | Refills: 0 | Status: SHIPPED | OUTPATIENT
Start: 2023-04-28

## 2023-04-28 RX ORDER — LEVOTHYROXINE SODIUM 0.07 MG/1
75 TABLET ORAL EVERY MORNING
Qty: 30 TABLET | Refills: 1 | Status: SHIPPED | OUTPATIENT
Start: 2023-04-28

## 2023-05-17 LAB
25(OH)D3 SERPL-MCNC: 35 NG/ML (ref 30–100)
ALBUMIN SERPL-MCNC: 4.1 G/DL (ref 3.6–5.1)
ALBUMIN/GLOB SERPL: 1.6 (CALC) (ref 1–2.5)
ALP SERPL-CCNC: 72 U/L (ref 35–144)
ALT SERPL-CCNC: 23 U/L (ref 9–46)
AST SERPL-CCNC: 14 U/L (ref 10–35)
BASOPHILS # BLD AUTO: 30 CELLS/UL (ref 0–200)
BASOPHILS NFR BLD AUTO: 0.4 %
BILIRUB SERPL-MCNC: 0.3 MG/DL (ref 0.2–1.2)
BUN SERPL-MCNC: 17 MG/DL (ref 7–25)
BUN/CREAT SERPL: ABNORMAL (CALC) (ref 6–22)
CALCIUM SERPL-MCNC: 9 MG/DL (ref 8.6–10.3)
CHLORIDE SERPL-SCNC: 105 MMOL/L (ref 98–110)
CHOLEST SERPL-MCNC: 233 MG/DL
CHOLEST/HDLC SERPL: 5.2 (CALC)
CO2 SERPL-SCNC: 31 MMOL/L (ref 20–32)
CREAT SERPL-MCNC: 1.18 MG/DL (ref 0.7–1.3)
EOSINOPHIL # BLD AUTO: 188 CELLS/UL (ref 15–500)
EOSINOPHIL NFR BLD AUTO: 2.5 %
ERYTHROCYTE [DISTWIDTH] IN BLOOD BY AUTOMATED COUNT: 12.4 % (ref 11–15)
GFR/BSA.PRED SERPLBLD CYS-BASED-ARV: 74 ML/MIN/1.73M2
GLOBULIN SER CALC-MCNC: 2.5 G/DL (CALC) (ref 1.9–3.7)
GLUCOSE SERPL-MCNC: 127 MG/DL (ref 65–99)
HCT VFR BLD AUTO: 42.7 % (ref 38.5–50)
HDLC SERPL-MCNC: 45 MG/DL
HGB BLD-MCNC: 14.1 G/DL (ref 13.2–17.1)
LDLC SERPL CALC-MCNC: 154 MG/DL (CALC)
LYMPHOCYTES # BLD AUTO: 1710 CELLS/UL (ref 850–3900)
LYMPHOCYTES NFR BLD AUTO: 22.8 %
MCH RBC QN AUTO: 31.5 PG (ref 27–33)
MCHC RBC AUTO-ENTMCNC: 33 G/DL (ref 32–36)
MCV RBC AUTO: 95.3 FL (ref 80–100)
MONOCYTES # BLD AUTO: 668 CELLS/UL (ref 200–950)
MONOCYTES NFR BLD AUTO: 8.9 %
NEUTROPHILS # BLD AUTO: 4905 CELLS/UL (ref 1500–7800)
NEUTROPHILS NFR BLD AUTO: 65.4 %
NONHDLC SERPL-MCNC: 188 MG/DL (CALC)
PLATELET # BLD AUTO: 228 THOUSAND/UL (ref 140–400)
PMV BLD REES-ECKER: 9.5 FL (ref 7.5–12.5)
POTASSIUM SERPL-SCNC: 4.2 MMOL/L (ref 3.5–5.3)
PROT SERPL-MCNC: 6.6 G/DL (ref 6.1–8.1)
RBC # BLD AUTO: 4.48 MILLION/UL (ref 4.2–5.8)
SODIUM SERPL-SCNC: 144 MMOL/L (ref 135–146)
TRIGL SERPL-MCNC: 206 MG/DL
TSH SERPL-ACNC: 2.15 MIU/L (ref 0.4–4.5)
WBC # BLD AUTO: 7.5 THOUSAND/UL (ref 3.8–10.8)

## 2023-05-23 ENCOUNTER — OFFICE VISIT (OUTPATIENT)
Dept: FAMILY MEDICINE CLINIC | Facility: CLINIC | Age: 53
End: 2023-05-23

## 2023-05-23 VITALS
RESPIRATION RATE: 20 BRPM | SYSTOLIC BLOOD PRESSURE: 160 MMHG | WEIGHT: 315 LBS | OXYGEN SATURATION: 96 % | DIASTOLIC BLOOD PRESSURE: 120 MMHG | BODY MASS INDEX: 45.1 KG/M2 | TEMPERATURE: 97.5 F | HEIGHT: 70 IN | HEART RATE: 96 BPM

## 2023-05-23 DIAGNOSIS — I10 UNCONTROLLED HYPERTENSION: ICD-10-CM

## 2023-05-23 DIAGNOSIS — Z12.11 COLON CANCER SCREENING: ICD-10-CM

## 2023-05-23 DIAGNOSIS — Z01.818 PREOPERATIVE CLEARANCE: ICD-10-CM

## 2023-05-23 DIAGNOSIS — Z11.4 SCREENING FOR HIV (HUMAN IMMUNODEFICIENCY VIRUS): ICD-10-CM

## 2023-05-23 DIAGNOSIS — Z11.59 NEED FOR HEPATITIS C SCREENING TEST: ICD-10-CM

## 2023-05-23 DIAGNOSIS — Z12.5 PROSTATE CANCER SCREENING: ICD-10-CM

## 2023-05-23 DIAGNOSIS — I15.9 SECONDARY HYPERTENSION: ICD-10-CM

## 2023-05-23 DIAGNOSIS — E11.9 TYPE 2 DIABETES MELLITUS WITHOUT COMPLICATION, WITHOUT LONG-TERM CURRENT USE OF INSULIN (HCC): Primary | ICD-10-CM

## 2023-05-23 DIAGNOSIS — F32.A DEPRESSION, UNSPECIFIED DEPRESSION TYPE: ICD-10-CM

## 2023-05-23 DIAGNOSIS — Z00.00 ANNUAL PHYSICAL EXAM: ICD-10-CM

## 2023-05-23 DIAGNOSIS — E78.5 HYPERLIPIDEMIA, UNSPECIFIED HYPERLIPIDEMIA TYPE: ICD-10-CM

## 2023-05-23 LAB
CREAT UR-MCNC: 168 MG/DL
LEFT EYE DIABETIC RETINOPATHY: NORMAL
LEFT EYE IMAGE QUALITY: NORMAL
LEFT EYE MACULAR EDEMA: NORMAL
LEFT EYE OTHER RETINOPATHY: NORMAL
MICROALBUMIN UR-MCNC: 34.9 MG/L (ref 0–20)
MICROALBUMIN/CREAT 24H UR: 21 MG/G CREATININE (ref 0–30)
RIGHT EYE DIABETIC RETINOPATHY: NORMAL
RIGHT EYE IMAGE QUALITY: NORMAL
RIGHT EYE MACULAR EDEMA: NORMAL
RIGHT EYE OTHER RETINOPATHY: NORMAL
SEVERITY (EYE EXAM): NORMAL
SL AMB POCT HEMOGLOBIN AIC: 6.7 (ref ?–6.5)

## 2023-05-23 RX ORDER — PRAVASTATIN SODIUM 40 MG
40 TABLET ORAL DAILY
Qty: 30 TABLET | Refills: 3 | Status: SHIPPED | OUTPATIENT
Start: 2023-05-23

## 2023-05-23 RX ORDER — QUINAPRIL 20 MG/1
TABLET ORAL
Qty: 90 TABLET | Refills: 0 | Status: SHIPPED | OUTPATIENT
Start: 2023-05-23 | End: 2023-05-25

## 2023-05-23 NOTE — PROGRESS NOTES
Diabetic Foot Exam    Patient's shoes and socks removed  Right Foot/Ankle   Right Foot Inspection  Skin Exam: skin normal and skin intact  No dry skin, no warmth, no callus, no erythema, no maceration, no abnormal color, no pre-ulcer, no ulcer and no callus  Toe Exam: ROM and strength within normal limits  Sensory   Monofilament testing: intact    Vascular  The right DP pulse is 1+  Left Foot/Ankle  Left Foot Inspection  Skin Exam: skin normal and skin intact  No dry skin, no warmth, no erythema, no maceration, normal color, no pre-ulcer, no ulcer and no callus  Toe Exam: ROM and strength within normal limits  Sensory   Monofilament testing: intact    Vascular  The left DP pulse is 1+  ADULT ANNUAL [facility]    NAME: Mane Almeida  AGE: 47 y o   SEX: male  : 1970     DATE: 2023     Assessment and Plan:     Problem List Items Addressed This Visit        Endocrine    Type 2 diabetes mellitus without complication, without long-term current use of insulin (HCC)    Relevant Orders    IRIS Diabetic eye exam    POCT hemoglobin A1c (Completed)    Albumin / creatinine urine ratio    Comprehensive metabolic panel    Hemoglobin A1C       Cardiovascular and Mediastinum    Benign essential hypertension    Relevant Medications    quinapril (ACCUPRIL) 20 mg tablet       Other    Hyperlipemia    Relevant Medications    pravastatin (PRAVACHOL) 40 mg tablet    Other Relevant Orders    Lipid panel   Other Visit Diagnoses     Annual physical exam    -  Primary    Colon cancer screening        Relevant Orders    Ambulatory Referral to Gastroenterology    Need for hepatitis C screening test        Relevant Orders    Hepatitis C Antibody    Screening for HIV (human immunodeficiency virus)        Relevant Orders    HIV 1/2 AG/AB w Reflex [facility] for 2 yr old and above    Prostate cancer screening        Relevant Orders    PSA, Total Screen    Preoperative clearance        Relevant Orders    Ambulatory Referral to Cardiology    Depression, unspecified depression type        Secondary hypertension        Relevant Medications    quinapril (ACCUPRIL) 20 mg tablet          Immunizations and preventive care screenings were discussed with patient today  Appropriate education was printed on patient's after visit summary  Counseling:  · As below as below    BMI Counseling: Body mass index is 54 81 kg/m²  The BMI is above normal  Nutrition recommendations include decreasing portion sizes, moderation in carbohydrate intake, increasing intake of lean protein, reducing intake of saturated and trans fat and reducing intake of cholesterol  Rationale for BMI follow-up plan is due to patient being overweight or obese  No follow-ups on file  Chief Complaint:     Chief Complaint   Patient presents with   • Physical Exam     Bmi follow up due, a1c due, diabetic foot exam, eye exam      History of Present Illness:     Adult Annual Physical   Patient here for a comprehensive physical exam  The patient reports problems - See attached note  Diet and Physical Activity  · Diet/Nutrition: diabetic diet  · Exercise: no formal exercise  Depression Screening  PHQ-2/9 Depression Screening         General Health  · Sleep: gets 7-8 hours of sleep on average  · Hearing: Normal  · Vision: no vision problems  · Dental: brushes teeth twice daily       Chews tobacco      Health  · Symptoms include: none     Review of Systems:     Review of Systems   Past Medical History:     Past Medical History:   Diagnosis Date   • Hypertension       Past Surgical History:     Past Surgical History:   Procedure Laterality Date   • REPLACEMENT TOTAL KNEE     • SHOULDER SURGERY        Family History:     Family History   Problem Relation Age of Onset   • Diabetes Father       Social History:     Social History     Socioeconomic History   • "Marital status: /Civil Union     Spouse name: None   • Number of children: None   • Years of education: None   • Highest education level: None   Occupational History   • None   Tobacco Use   • Smoking status: Never   • Smokeless tobacco: Never   Vaping Use   • Vaping Use: Never used   Substance and Sexual Activity   • Alcohol use: No   • Drug use: None   • Sexual activity: None   Other Topics Concern   • None   Social History Narrative   • None     Social Determinants of Health     Financial Resource Strain: Not on file   Food Insecurity: Not on file   Transportation Needs: Not on file   Physical Activity: Not on file   Stress: Not on file   Social Connections: Not on file   Intimate Partner Violence: Not on file   Housing Stability: Not on file      Current Medications:     Current Outpatient Medications   Medication Sig Dispense Refill   • ALPRAZolam (XANAX) 0 5 mg tablet TAKE 1 TABLET BY MOUTH 2 TIMES A DAY  60 tablet 0   • citalopram (CeleXA) 20 mg tablet Take 1 tablet (20 mg total) by mouth every morning 30 tablet 0   • dulaglutide (Trulicity) 6 77 EM/0 7RW injection Inject 0 5 mL (0 75 mg total) under the skin every 7 days 3 mL 0   • ergocalciferol (VITAMIN D2) 50,000 units TAKE 1 CAPSULE BY MOUTH ONE TIME PER WEEK 8 capsule 0   • lansoprazole (PREVACID) 30 mg capsule Take 1 capsule (30 mg total) by mouth daily 90 capsule 0   • levothyroxine 75 mcg tablet Take 1 tablet (75 mcg total) by mouth every morning 30 tablet 1   • pravastatin (PRAVACHOL) 40 mg tablet Take 1 tablet (40 mg total) by mouth daily 30 tablet 3   • quinapril (ACCUPRIL) 20 mg tablet 1-1/2 pill a day 90 tablet 0     No current facility-administered medications for this visit  Allergies:     No Known Allergies   Physical Exam:     BP (!) 160/120   Pulse 96   Temp 97 5 °F (36 4 °C)   Resp 20   Ht 5' 10\" (1 778 m)   Wt (!) 173 kg (382 lb)   SpO2 96%   BMI 54 81 kg/m²     Physical Exam  Vitals and nursing note reviewed   " Constitutional:       General: He is not in acute distress  Appearance: He is well-developed  HENT:      Head: Normocephalic and atraumatic  Eyes:      Conjunctiva/sclera: Conjunctivae normal    Cardiovascular:      Rate and Rhythm: Normal rate and regular rhythm  Pulses:           Dorsalis pedis pulses are 1+ on the right side and 1+ on the left side  Heart sounds: No murmur heard  Pulmonary:      Effort: Pulmonary effort is normal  No respiratory distress  Breath sounds: Normal breath sounds  No wheezing  Abdominal:      General: There is no distension  Tenderness: There is no abdominal tenderness  There is no guarding  Musculoskeletal:         General: No swelling  Cervical back: Neck supple  Right lower leg: No edema  Left lower leg: No edema  Feet:      Right foot:      Skin integrity: No ulcer, skin breakdown, erythema, warmth, callus or dry skin  Left foot:      Skin integrity: No ulcer, skin breakdown, erythema, warmth, callus or dry skin  Skin:     General: Skin is warm and dry  Neurological:      Mental Status: He is alert     Psychiatric:         Mood and Affect: Mood normal          Behavior: Behavior normal           Justina Braxton MD  [address]

## 2023-05-23 NOTE — PROGRESS NOTES
Assessment/Plan:           Problem List Items Addressed This Visit        Endocrine    Type 2 diabetes mellitus without complication, without long-term current use of insulin (HCC)    Relevant Orders    [facility] Diabetic eye exam    POCT hemoglobin A1c (Completed)    Albumin / creatinine urine ratio    Comprehensive metabolic panel    Hemoglobin A1C       Cardiovascular and Mediastinum    Benign essential hypertension    Relevant Medications    quinapril (ACCUPRIL) 20 mg tablet       Other    Hyperlipemia    Relevant Medications    pravastatin (PRAVACHOL) 40 mg tablet    Other Relevant Orders    Lipid panel   Other Visit Diagnoses     Annual physical exam    -  Primary    Colon cancer screening        Relevant Orders    Ambulatory Referral to Gastroenterology    Need for hepatitis C screening test        Relevant Orders    Hepatitis C Antibody    Screening for HIV (human immunodeficiency virus)        Relevant Orders    HIV 1/2 AG/AB w Reflex [facility] for 2 yr old and above    Prostate cancer screening        Relevant Orders    PSA, Total Screen    Preoperative clearance        Relevant Orders    Ambulatory Referral to Cardiology    Depression, unspecified depression type        Secondary hypertension        Relevant Medications    quinapril (ACCUPRIL) 20 mg tablet            Subjective:      Patient ID: Tyrell Noguera is a 47 y o  male  HPI  Patient with history of hypertension hyperlipidemia diabetes mellitus morbid obesity is here to follow-up on chronic medical problems after several months of absence  Recent of studies were reviewed  Blood sugar is higher than last time at 6 7  Fasting blood sugar is fairly well controlled at home  Is tolerating Trulicity well  No episodes of hypoglycemia  Denies any numbness and tingling in his feet  [facility] eye exam was performed in the office today  Pressure is on the higher side today  He is on quinapril 20 mg a day  Would increase it to 30 mg    Patient has not followed "up with sleep study  His last cholesterol was concerning LDL at 154  Patient is not taking Crestor 20 mg a day  He reports it was giving him headaches even when he cut it down to 10 mg affect was the same  I would order Pravachol and see if he can tolerate the medication  Follow-up lab studies in 6 weeks  Patient is morbid obesity was addressed  Bariatric surgery is strongly recommended  Cardiac work-up would be started cardiology consultation  Reports her depression to be well controlled on Celexa 20 mg a day and as needed Xanax 0 5  PDMP reviewed without any concerns  The following portions of the patient's history were reviewed and updated as appropriate: allergies, current medications, past family history, past medical history, past social history, past surgical history and problem list     Review of Systems      Objective:      BP (!) 160/120   Pulse 96   Temp 97 5 °F (36 4 °C)   Resp 20   Ht 5' 10\" (1 778 m)   Wt (!) 173 kg (382 lb)   SpO2 96%   BMI 54 81 kg/m²          Physical Exam  Cardiovascular:      Rate and Rhythm: Normal rate and regular rhythm  Heart sounds: Normal heart sounds  No murmur heard  Pulmonary:      Effort: Pulmonary effort is normal  No respiratory distress  Breath sounds: Normal breath sounds  No wheezing  Abdominal:      Comments: Morbidly obese   Neurological:      Mental Status: He is alert     Psychiatric:         Mood and Affect: Mood normal          Behavior: Behavior normal                  "

## 2023-05-24 DIAGNOSIS — R52 PAIN: ICD-10-CM

## 2023-05-24 DIAGNOSIS — F32.A DEPRESSION, UNSPECIFIED DEPRESSION TYPE: ICD-10-CM

## 2023-05-24 DIAGNOSIS — E03.9 HYPOTHYROIDISM, UNSPECIFIED TYPE: ICD-10-CM

## 2023-05-24 DIAGNOSIS — R10.13 DYSPEPSIA: ICD-10-CM

## 2023-05-24 RX ORDER — LEVOTHYROXINE SODIUM 0.07 MG/1
75 TABLET ORAL EVERY MORNING
Qty: 30 TABLET | Refills: 0 | Status: SHIPPED | OUTPATIENT
Start: 2023-05-24

## 2023-05-24 RX ORDER — LANSOPRAZOLE 30 MG/1
30 CAPSULE, DELAYED RELEASE ORAL DAILY
Qty: 90 CAPSULE | Refills: 0 | Status: SHIPPED | OUTPATIENT
Start: 2023-05-24

## 2023-05-24 RX ORDER — CITALOPRAM 20 MG/1
20 TABLET ORAL EVERY MORNING
Qty: 30 TABLET | Refills: 0 | Status: SHIPPED | OUTPATIENT
Start: 2023-05-24

## 2023-05-24 RX ORDER — ALPRAZOLAM 0.5 MG/1
0.5 TABLET ORAL 2 TIMES DAILY
Qty: 60 TABLET | Refills: 0 | Status: SHIPPED | OUTPATIENT
Start: 2023-05-24

## 2023-05-25 DIAGNOSIS — E11.9 TYPE 2 DIABETES MELLITUS WITHOUT COMPLICATION, WITH LONG-TERM CURRENT USE OF INSULIN (HCC): ICD-10-CM

## 2023-05-25 DIAGNOSIS — Z79.4 TYPE 2 DIABETES MELLITUS WITHOUT COMPLICATION, WITH LONG-TERM CURRENT USE OF INSULIN (HCC): ICD-10-CM

## 2023-05-25 RX ORDER — DULAGLUTIDE 0.75 MG/.5ML
0.75 INJECTION, SOLUTION SUBCUTANEOUS
Qty: 5 ML | Refills: 3 | Status: SHIPPED | OUTPATIENT
Start: 2023-05-25

## 2023-06-21 ENCOUNTER — TELEPHONE (OUTPATIENT)
Dept: ADMINISTRATIVE | Facility: OTHER | Age: 53
End: 2023-06-21

## 2023-06-21 DIAGNOSIS — F32.A DEPRESSION, UNSPECIFIED DEPRESSION TYPE: ICD-10-CM

## 2023-06-21 RX ORDER — CITALOPRAM 20 MG/1
TABLET ORAL
Qty: 30 TABLET | Refills: 0 | Status: SHIPPED | OUTPATIENT
Start: 2023-06-21

## 2023-06-21 NOTE — TELEPHONE ENCOUNTER
Upon review of the In Basket request we were able to locate, review, and update the patient chart as requested for Diabetic Foot Exam     Any additional questions or concerns should be emailed to the Practice Liaisons via the appropriate education email address, please do not reply via In Basket      Thank you  Imani Adams

## 2023-06-26 DIAGNOSIS — E03.9 HYPOTHYROIDISM, UNSPECIFIED TYPE: ICD-10-CM

## 2023-06-26 DIAGNOSIS — R52 PAIN: ICD-10-CM

## 2023-06-26 RX ORDER — ALPRAZOLAM 0.5 MG/1
0.5 TABLET ORAL 2 TIMES DAILY
Qty: 60 TABLET | Refills: 0 | Status: SHIPPED | OUTPATIENT
Start: 2023-06-26

## 2023-06-26 RX ORDER — LEVOTHYROXINE SODIUM 0.07 MG/1
TABLET ORAL
Qty: 30 TABLET | Refills: 0 | Status: SHIPPED | OUTPATIENT
Start: 2023-06-26

## 2023-07-22 DIAGNOSIS — F32.A DEPRESSION, UNSPECIFIED DEPRESSION TYPE: ICD-10-CM

## 2023-07-24 RX ORDER — CITALOPRAM 20 MG/1
TABLET ORAL
Qty: 30 TABLET | Refills: 0 | Status: SHIPPED | OUTPATIENT
Start: 2023-07-24

## 2023-07-26 DIAGNOSIS — R52 PAIN: ICD-10-CM

## 2023-07-27 DIAGNOSIS — E03.9 HYPOTHYROIDISM, UNSPECIFIED TYPE: ICD-10-CM

## 2023-07-27 RX ORDER — LEVOTHYROXINE SODIUM 0.07 MG/1
TABLET ORAL
Qty: 30 TABLET | Refills: 0 | Status: SHIPPED | OUTPATIENT
Start: 2023-07-27

## 2023-07-27 RX ORDER — ALPRAZOLAM 0.5 MG/1
0.5 TABLET ORAL 2 TIMES DAILY
Qty: 60 TABLET | Refills: 0 | Status: SHIPPED | OUTPATIENT
Start: 2023-07-27

## 2023-08-20 DIAGNOSIS — R10.13 DYSPEPSIA: ICD-10-CM

## 2023-08-21 RX ORDER — LANSOPRAZOLE 30 MG/1
30 CAPSULE, DELAYED RELEASE ORAL DAILY
Qty: 90 CAPSULE | Refills: 0 | Status: SHIPPED | OUTPATIENT
Start: 2023-08-21

## 2023-08-24 DIAGNOSIS — F32.A DEPRESSION, UNSPECIFIED DEPRESSION TYPE: ICD-10-CM

## 2023-08-24 DIAGNOSIS — E03.9 HYPOTHYROIDISM, UNSPECIFIED TYPE: ICD-10-CM

## 2023-08-24 RX ORDER — CITALOPRAM 20 MG/1
TABLET ORAL
Qty: 30 TABLET | Refills: 0 | Status: SHIPPED | OUTPATIENT
Start: 2023-08-24

## 2023-08-24 RX ORDER — LEVOTHYROXINE SODIUM 0.07 MG/1
TABLET ORAL
Qty: 30 TABLET | Refills: 0 | Status: SHIPPED | OUTPATIENT
Start: 2023-08-24

## 2023-08-28 DIAGNOSIS — R52 PAIN: ICD-10-CM

## 2023-08-28 RX ORDER — ALPRAZOLAM 0.5 MG/1
0.5 TABLET ORAL 2 TIMES DAILY
Qty: 60 TABLET | Refills: 0 | Status: SHIPPED | OUTPATIENT
Start: 2023-08-28

## 2023-09-19 DIAGNOSIS — E78.5 HYPERLIPIDEMIA, UNSPECIFIED HYPERLIPIDEMIA TYPE: ICD-10-CM

## 2023-09-19 RX ORDER — PRAVASTATIN SODIUM 40 MG
40 TABLET ORAL DAILY
Qty: 30 TABLET | Refills: 3 | Status: SHIPPED | OUTPATIENT
Start: 2023-09-19

## 2023-09-20 DIAGNOSIS — E03.9 HYPOTHYROIDISM, UNSPECIFIED TYPE: ICD-10-CM

## 2023-09-20 DIAGNOSIS — F32.A DEPRESSION, UNSPECIFIED DEPRESSION TYPE: ICD-10-CM

## 2023-09-20 RX ORDER — LEVOTHYROXINE SODIUM 0.07 MG/1
TABLET ORAL
Qty: 30 TABLET | Refills: 0 | Status: SHIPPED | OUTPATIENT
Start: 2023-09-20

## 2023-09-20 RX ORDER — CITALOPRAM 20 MG/1
TABLET ORAL
Qty: 30 TABLET | Refills: 0 | Status: SHIPPED | OUTPATIENT
Start: 2023-09-20

## 2023-10-02 DIAGNOSIS — R52 PAIN: ICD-10-CM

## 2023-10-02 RX ORDER — ALPRAZOLAM 0.5 MG/1
0.5 TABLET ORAL 2 TIMES DAILY
Qty: 60 TABLET | Refills: 0 | Status: SHIPPED | OUTPATIENT
Start: 2023-10-02

## 2023-10-17 DIAGNOSIS — E03.9 HYPOTHYROIDISM, UNSPECIFIED TYPE: Primary | ICD-10-CM

## 2023-10-17 DIAGNOSIS — E11.9 TYPE 2 DIABETES MELLITUS WITHOUT COMPLICATION, WITHOUT LONG-TERM CURRENT USE OF INSULIN (HCC): ICD-10-CM

## 2023-10-17 DIAGNOSIS — E78.5 HYPERLIPIDEMIA, UNSPECIFIED HYPERLIPIDEMIA TYPE: ICD-10-CM

## 2023-10-18 DIAGNOSIS — F52.0 LACK OF LIBIDO: Primary | ICD-10-CM

## 2023-10-18 DIAGNOSIS — M25.50 ARTHRALGIA, UNSPECIFIED JOINT: ICD-10-CM

## 2023-10-24 ENCOUNTER — PREP FOR PROCEDURE (OUTPATIENT)
Dept: GASTROENTEROLOGY | Facility: CLINIC | Age: 53
End: 2023-10-24

## 2023-10-24 ENCOUNTER — TELEPHONE (OUTPATIENT)
Dept: GASTROENTEROLOGY | Facility: CLINIC | Age: 53
End: 2023-10-24

## 2023-10-24 DIAGNOSIS — E03.9 HYPOTHYROIDISM, UNSPECIFIED TYPE: ICD-10-CM

## 2023-10-24 DIAGNOSIS — Z12.11 COLON CANCER SCREENING: Primary | ICD-10-CM

## 2023-10-24 DIAGNOSIS — F32.A DEPRESSION, UNSPECIFIED DEPRESSION TYPE: ICD-10-CM

## 2023-10-24 RX ORDER — LEVOTHYROXINE SODIUM 0.07 MG/1
TABLET ORAL
Qty: 30 TABLET | Refills: 0 | Status: SHIPPED | OUTPATIENT
Start: 2023-10-24

## 2023-10-24 RX ORDER — CITALOPRAM 20 MG/1
TABLET ORAL
Qty: 30 TABLET | Refills: 0 | Status: SHIPPED | OUTPATIENT
Start: 2023-10-24

## 2023-10-24 NOTE — TELEPHONE ENCOUNTER
10/24/23  Screened by: Long Schreiber MA    Referring Provider Andria Bales    Pre- Screening: Body mass index is 54.81 kg/m². Has patient been referred for a routine screening Colonoscopy? yes  Is the patient between 43-73 years old? yes      Previous Colonoscopy no   If yes:    Date:     Facility:     Reason:       SCHEDULING STAFF: If the patient is between 45yrs-49yrs, please advise patient to confirm benefits/coverage with their insurance company for a routine screening colonoscopy, some insurance carriers will only cover at [address] [place] [address] or older. If the patient is over 66years old, please schedule an office visit. Does the patient want to see a Gastroenterologist prior to their procedure OR are they having any GI symptoms? no    Has the patient been hospitalized or had abdominal surgery in the past 6 months? no    Does the patient use supplemental oxygen? no    Does the patient take Coumadin, Lovenox, Plavix, Elliquis, Xarelto, or other blood thinning medication? no    Has the patient had a stroke, cardiac event, or stent placed in the past year? no    SCHEDULING STAFF: If patient answers NO to above questions, then schedule procedure. If patient answers YES to above questions, then schedule office appointment. If patient is between 45yrs - 49yrs, please advise patient that we will have to confirm benefits & coverage with their insurance company for a routine screening colonoscopy.         Scheduled date of colonoscopy (as of today):  2/5/24  Physician performing colonoscopy:   Location of colonoscopy:  Bowel prep reviewed with patient: Miralax/Dulcolax  Instructions reviewed with patient by:Lucy/sebastien  Clearances: N/A

## 2023-10-30 DIAGNOSIS — R52 PAIN: ICD-10-CM

## 2023-10-30 LAB
ALBUMIN SERPL-MCNC: 4.5 G/DL (ref 3.6–5.1)
ALBUMIN/GLOB SERPL: 1.5 (CALC) (ref 1–2.5)
ALP SERPL-CCNC: 90 U/L (ref 35–144)
ALT SERPL-CCNC: 28 U/L (ref 9–46)
AST SERPL-CCNC: 13 U/L (ref 10–35)
BILIRUB SERPL-MCNC: 0.3 MG/DL (ref 0.2–1.2)
BUN SERPL-MCNC: 18 MG/DL (ref 7–25)
BUN/CREAT SERPL: ABNORMAL (CALC) (ref 6–22)
CALCIUM SERPL-MCNC: 9.4 MG/DL (ref 8.6–10.3)
CHLORIDE SERPL-SCNC: 102 MMOL/L (ref 98–110)
CHOLEST SERPL-MCNC: 266 MG/DL
CHOLEST/HDLC SERPL: 6.3 (CALC)
CO2 SERPL-SCNC: 27 MMOL/L (ref 20–32)
CREAT SERPL-MCNC: 1.22 MG/DL (ref 0.7–1.3)
GFR/BSA.PRED SERPLBLD CYS-BASED-ARV: 71 ML/MIN/1.73M2
GLOBULIN SER CALC-MCNC: 3.1 G/DL (CALC) (ref 1.9–3.7)
GLUCOSE SERPL-MCNC: 180 MG/DL (ref 65–99)
HBA1C MFR BLD: 8.4 % OF TOTAL HGB
HDLC SERPL-MCNC: 42 MG/DL
LDLC SERPL CALC-MCNC: 180 MG/DL (CALC)
NONHDLC SERPL-MCNC: 224 MG/DL (CALC)
POTASSIUM SERPL-SCNC: 4.4 MMOL/L (ref 3.5–5.3)
PROT SERPL-MCNC: 7.6 G/DL (ref 6.1–8.1)
SODIUM SERPL-SCNC: 141 MMOL/L (ref 135–146)
TESTOST SERPL-MCNC: 50 PG/ML (ref 46–224)
TRIGL SERPL-MCNC: 236 MG/DL
TSH SERPL-ACNC: 2.27 MIU/L (ref 0.4–4.5)
URATE SERPL-MCNC: 8.3 MG/DL (ref 4–8)

## 2023-10-31 NOTE — RESULT ENCOUNTER NOTE
Cholesterol and blood sugar is uncontrolled.   Please set up an virtual appointment to discuss abnormal labs, if patient cannot come in

## 2023-11-01 ENCOUNTER — TELEPHONE (OUTPATIENT)
Dept: FAMILY MEDICINE CLINIC | Facility: CLINIC | Age: 53
End: 2023-11-01

## 2023-11-01 RX ORDER — ALPRAZOLAM 0.5 MG/1
0.5 TABLET ORAL 2 TIMES DAILY
Qty: 60 TABLET | Refills: 0 | Status: SHIPPED | OUTPATIENT
Start: 2023-11-01

## 2023-11-01 NOTE — TELEPHONE ENCOUNTER
----- Message from Balwinder Castro MD sent at 10/31/2023  4:33 PM EDT -----  Cholesterol and blood sugar is uncontrolled.   Please set up an virtual appointment to discuss abnormal labs, if patient cannot come in

## 2023-11-02 ENCOUNTER — TELEMEDICINE (OUTPATIENT)
Dept: FAMILY MEDICINE CLINIC | Facility: CLINIC | Age: 53
End: 2023-11-02
Payer: COMMERCIAL

## 2023-11-02 DIAGNOSIS — Z79.4 TYPE 2 DIABETES MELLITUS WITHOUT COMPLICATION, WITH LONG-TERM CURRENT USE OF INSULIN (HCC): Primary | ICD-10-CM

## 2023-11-02 DIAGNOSIS — I15.9 SECONDARY HYPERTENSION: ICD-10-CM

## 2023-11-02 DIAGNOSIS — E78.5 HYPERLIPIDEMIA, UNSPECIFIED HYPERLIPIDEMIA TYPE: ICD-10-CM

## 2023-11-02 DIAGNOSIS — E11.9 TYPE 2 DIABETES MELLITUS WITHOUT COMPLICATION, WITH LONG-TERM CURRENT USE OF INSULIN (HCC): Primary | ICD-10-CM

## 2023-11-02 DIAGNOSIS — I10 UNCONTROLLED HYPERTENSION: ICD-10-CM

## 2023-11-02 PROCEDURE — 99214 OFFICE O/P EST MOD 30 MIN: CPT | Performed by: INTERNAL MEDICINE

## 2023-11-02 RX ORDER — METFORMIN HYDROCHLORIDE 500 MG/1
500 TABLET, EXTENDED RELEASE ORAL
Qty: 30 TABLET | Refills: 1 | Status: SHIPPED | OUTPATIENT
Start: 2023-11-02

## 2023-11-02 RX ORDER — RAMIPRIL 10 MG/1
10 CAPSULE ORAL DAILY
Qty: 30 CAPSULE | Refills: 1 | Status: SHIPPED | OUTPATIENT
Start: 2023-11-02

## 2023-11-02 NOTE — PROGRESS NOTES
Virtual Regular Visit    Verification of patient location:    Patient is located at Other in the following state in which I hold an active license Other; [place]      Assessment/Plan:    Problem List Items Addressed This Visit       Hyperlipemia    Relevant Orders    Lipid Panel with Direct LDL reflex     Other Visit Diagnoses       Type 2 diabetes mellitus without complication, with long-term current use of insulin ([address])    -  Primary    Relevant Medications    dulaglutide (Trulicity) 1.5 UX/8.2WX injection    metFORMIN (GLUCOPHAGE-XR) 500 mg 24 hr tablet    Other Relevant Orders    Comprehensive metabolic panel    Albumin / creatinine urine ratio    Hemoglobin A1C    Uncontrolled hypertension        Relevant Medications    ramipril (ALTACE) 10 MG capsule    Secondary hypertension        Relevant Medications    ramipril (ALTACE) 10 MG capsule                 Reason for visit is No chief complaint on file. Encounter provider Arsenio Max MD    Provider [facility] [address] [facility] [address]  [phone]      Recent Visits  Date Type Provider Dept   11/01/23 Telephone [address] recent visits within past 7 days and meeting all other requirements  Today's Visits  Date Type Provider Dept   11/02/23 Telemedicine Arsenio Max MD [address] today's visits and meeting all other requirements  Future Appointments  No visits were found meeting these conditions. Showing future appointments within next 150 days and meeting all other requirements       The patient was identified by name and date of birth. Marlin Ryan was informed that this is a telemedicine visit and that the visit is being conducted through the [address] [place] [address] platform. He agrees to proceed. .  My office door was closed. No one else was in the room.   He acknowledged consent and understanding of privacy and security of the video platform. The patient has agreed to participate and understands they can discontinue the visit at any time. Patient is aware this is a billable service. Josue Vegas is a 47 y.o. male  . Patient was evaluated telehealth to discuss recent lab studies and diabetic management. Unfortunately patient is currently in [place] and would not be back in town until the beginning of next year. 8.4 from 6.7 the time before. He is compliant with his diet and has been watching his carbohydrate intake very religiously. He has been taking his Trulicity religiously. We will increase it to 1.5. A prescription will be sent into his [address]. I will also place him back on metformin once a day. On the higher dose he had diarrhea. Hopefully he can tolerate a low-dose to help with insulin resistance. He reports his blood pressure to be running between 1 30-1 50. He is on Altace. I asked him to increase his ramipril to 10 mg to keep his blood pressure consistently around 130. He will keep me posted. Lastly, his cholesterol is quite elevated with . He mentions that he does not take his medication on a regular basis and was off of the statin for 3 weeks before going for appointment. I would at this time not make any change to his protocol and will continue the same. To note he was on Crestor in the past and could not tolerate the medication is giving him headaches. I also educated him that in the future we can use PCSK9 inhibitors if he is unable to tolerate the medication. Patient will keep me posted and we will also make an appointment for January to follow-up with me. Also mentions that he has colonoscopy scheduled for February. Encouraged him to get a flu vaccine today.       HPI     Past Medical History:   Diagnosis Date    Hypertension        Past Surgical History:   Procedure Laterality Date    REPLACEMENT TOTAL KNEE      SHOULDER SURGERY Current Outpatient Medications   Medication Sig Dispense Refill    dulaglutide (Trulicity) 1.5 GN/2.3IO injection Inject 0.5 mL (1.5 mg total) under the skin every 7 days 6 mL 1    metFORMIN (GLUCOPHAGE-XR) 500 mg 24 hr tablet Take 1 tablet (500 mg total) by mouth daily with dinner 30 tablet 1    ramipril (ALTACE) 10 MG capsule Take 1 capsule (10 mg total) by mouth daily 30 capsule 1    ALPRAZolam (XANAX) 0.5 mg tablet Take 1 tablet (0.5 mg total) by mouth 2 (two) times a day 60 tablet 0    citalopram (CeleXA) 20 mg tablet TAKE 1 TABLET BY MOUTH EVERY DAY IN THE MORNING 30 tablet 0    ergocalciferol (VITAMIN D2) 50,000 units TAKE 1 CAPSULE BY MOUTH ONE TIME PER WEEK 8 capsule 0    lansoprazole (PREVACID) 30 mg capsule TAKE 1 CAPSULE BY MOUTH EVERY DAY 90 capsule 0    levothyroxine 75 mcg tablet TAKE 1 TABLET BY MOUTH EVERY DAY IN THE MORNING 30 tablet 0    pravastatin (PRAVACHOL) 40 mg tablet TAKE 1 TABLET BY MOUTH EVERY DAY 30 tablet 3     No current facility-administered medications for this visit. No Known Allergies    Review of Systems    Video Exam    There were no vitals filed for this visit. Physical Exam  Neurological:      Mental Status: He is alert.           Visit Time  Total Visit Duration: 20 min

## 2023-11-05 DIAGNOSIS — E55.9 VITAMIN D DEFICIENCY: ICD-10-CM

## 2023-11-06 DIAGNOSIS — R79.89 LOW TESTOSTERONE: Primary | ICD-10-CM

## 2023-11-06 RX ORDER — ERGOCALCIFEROL 1.25 MG/1
CAPSULE ORAL
Qty: 8 CAPSULE | Refills: 0 | Status: SHIPPED | OUTPATIENT
Start: 2023-11-06 | End: 2023-11-15

## 2023-11-10 DIAGNOSIS — F32.A DEPRESSION, UNSPECIFIED DEPRESSION TYPE: ICD-10-CM

## 2023-11-10 RX ORDER — CITALOPRAM 20 MG/1
TABLET ORAL
Qty: 30 TABLET | Refills: 0 | Status: SHIPPED | OUTPATIENT
Start: 2023-11-10

## 2023-11-15 DIAGNOSIS — E03.9 HYPOTHYROIDISM, UNSPECIFIED TYPE: ICD-10-CM

## 2023-11-15 DIAGNOSIS — E55.9 VITAMIN D DEFICIENCY: ICD-10-CM

## 2023-11-15 RX ORDER — LEVOTHYROXINE SODIUM 0.07 MG/1
TABLET ORAL
Qty: 30 TABLET | Refills: 0 | Status: SHIPPED | OUTPATIENT
Start: 2023-11-15

## 2023-11-15 RX ORDER — ERGOCALCIFEROL 1.25 MG/1
CAPSULE ORAL
Qty: 8 CAPSULE | Refills: 0 | Status: SHIPPED | OUTPATIENT
Start: 2023-11-15

## 2023-11-19 DIAGNOSIS — R10.13 DYSPEPSIA: ICD-10-CM

## 2023-11-20 RX ORDER — LANSOPRAZOLE 30 MG/1
30 CAPSULE, DELAYED RELEASE ORAL DAILY
Qty: 90 CAPSULE | Refills: 0 | Status: SHIPPED | OUTPATIENT
Start: 2023-11-20

## 2023-11-28 DIAGNOSIS — R52 PAIN: ICD-10-CM

## 2023-11-28 RX ORDER — ALPRAZOLAM 0.5 MG/1
0.5 TABLET ORAL 2 TIMES DAILY
Qty: 60 TABLET | Refills: 0 | Status: SHIPPED | OUTPATIENT
Start: 2023-11-28

## 2023-12-04 DIAGNOSIS — Z79.4 TYPE 2 DIABETES MELLITUS WITHOUT COMPLICATION, WITH LONG-TERM CURRENT USE OF INSULIN (HCC): ICD-10-CM

## 2023-12-04 DIAGNOSIS — E11.9 TYPE 2 DIABETES MELLITUS WITHOUT COMPLICATION, WITH LONG-TERM CURRENT USE OF INSULIN (HCC): ICD-10-CM

## 2023-12-04 RX ORDER — METFORMIN HYDROCHLORIDE 500 MG/1
500 TABLET, EXTENDED RELEASE ORAL
Qty: 30 TABLET | Refills: 0 | Status: SHIPPED | OUTPATIENT
Start: 2023-12-04

## 2023-12-08 DIAGNOSIS — Z79.4 TYPE 2 DIABETES MELLITUS WITHOUT COMPLICATION, WITH LONG-TERM CURRENT USE OF INSULIN (HCC): ICD-10-CM

## 2023-12-08 DIAGNOSIS — E11.9 TYPE 2 DIABETES MELLITUS WITHOUT COMPLICATION, WITH LONG-TERM CURRENT USE OF INSULIN (HCC): ICD-10-CM

## 2023-12-26 DIAGNOSIS — E03.9 HYPOTHYROIDISM, UNSPECIFIED TYPE: ICD-10-CM

## 2023-12-26 RX ORDER — LEVOTHYROXINE SODIUM 0.07 MG/1
TABLET ORAL
Qty: 30 TABLET | Refills: 0 | Status: SHIPPED | OUTPATIENT
Start: 2023-12-26

## 2023-12-31 DIAGNOSIS — Z79.4 TYPE 2 DIABETES MELLITUS WITHOUT COMPLICATION, WITH LONG-TERM CURRENT USE OF INSULIN (HCC): ICD-10-CM

## 2023-12-31 DIAGNOSIS — E11.9 TYPE 2 DIABETES MELLITUS WITHOUT COMPLICATION, WITH LONG-TERM CURRENT USE OF INSULIN (HCC): ICD-10-CM

## 2024-01-02 RX ORDER — METFORMIN HYDROCHLORIDE 500 MG/1
500 TABLET, EXTENDED RELEASE ORAL
Qty: 90 TABLET | Refills: 1 | Status: SHIPPED | OUTPATIENT
Start: 2024-01-02

## 2024-01-03 DIAGNOSIS — I15.9 SECONDARY HYPERTENSION: ICD-10-CM

## 2024-01-03 DIAGNOSIS — F32.A DEPRESSION, UNSPECIFIED DEPRESSION TYPE: ICD-10-CM

## 2024-01-03 DIAGNOSIS — R52 PAIN: ICD-10-CM

## 2024-01-03 RX ORDER — CITALOPRAM 20 MG/1
20 TABLET ORAL EVERY MORNING
Qty: 30 TABLET | Refills: 3 | Status: SHIPPED | OUTPATIENT
Start: 2024-01-03

## 2024-01-03 RX ORDER — RAMIPRIL 10 MG/1
10 CAPSULE ORAL DAILY
Qty: 30 CAPSULE | Refills: 1 | Status: SHIPPED | OUTPATIENT
Start: 2024-01-03

## 2024-01-03 RX ORDER — ALPRAZOLAM 0.5 MG/1
0.5 TABLET ORAL 2 TIMES DAILY
Qty: 60 TABLET | Refills: 0 | Status: SHIPPED | OUTPATIENT
Start: 2024-01-03

## 2024-01-13 DIAGNOSIS — Z79.4 TYPE 2 DIABETES MELLITUS WITHOUT COMPLICATION, WITH LONG-TERM CURRENT USE OF INSULIN (HCC): ICD-10-CM

## 2024-01-13 DIAGNOSIS — E11.9 TYPE 2 DIABETES MELLITUS WITHOUT COMPLICATION, WITH LONG-TERM CURRENT USE OF INSULIN (HCC): ICD-10-CM

## 2024-01-23 ENCOUNTER — TELEPHONE (OUTPATIENT)
Age: 54
End: 2024-01-23

## 2024-01-23 NOTE — TELEPHONE ENCOUNTER
Resheduled date of colonoscopy (as of today): 3/18/2024  Physician performing colonoscopy: DR JAIYEOLA  Location of colonoscopy:   Bowel prep reviewed with patient: MIRALAX/DULCOLAX  Instructions reviewed with patient by: Previoiusly reviewed with pt. Verified pt has all procedure directions.  Clearances:

## 2024-01-27 DIAGNOSIS — I15.9 SECONDARY HYPERTENSION: ICD-10-CM

## 2024-01-29 RX ORDER — RAMIPRIL 10 MG/1
10 CAPSULE ORAL DAILY
Qty: 90 CAPSULE | Refills: 1 | Status: SHIPPED | OUTPATIENT
Start: 2024-01-29

## 2024-01-31 DIAGNOSIS — F32.A DEPRESSION, UNSPECIFIED DEPRESSION TYPE: ICD-10-CM

## 2024-01-31 DIAGNOSIS — E78.5 HYPERLIPIDEMIA, UNSPECIFIED HYPERLIPIDEMIA TYPE: ICD-10-CM

## 2024-01-31 DIAGNOSIS — E03.9 HYPOTHYROIDISM, UNSPECIFIED TYPE: ICD-10-CM

## 2024-01-31 DIAGNOSIS — R52 PAIN: ICD-10-CM

## 2024-01-31 RX ORDER — LEVOTHYROXINE SODIUM 0.07 MG/1
75 TABLET ORAL EVERY MORNING
Qty: 30 TABLET | Refills: 5 | Status: SHIPPED | OUTPATIENT
Start: 2024-01-31

## 2024-01-31 RX ORDER — PRAVASTATIN SODIUM 40 MG
40 TABLET ORAL DAILY
Qty: 30 TABLET | Refills: 5 | Status: SHIPPED | OUTPATIENT
Start: 2024-01-31

## 2024-01-31 RX ORDER — ALPRAZOLAM 0.5 MG/1
0.5 TABLET ORAL 2 TIMES DAILY
Qty: 60 TABLET | Refills: 0 | Status: SHIPPED | OUTPATIENT
Start: 2024-01-31

## 2024-01-31 RX ORDER — CITALOPRAM 20 MG/1
20 TABLET ORAL EVERY MORNING
Qty: 30 TABLET | Refills: 5 | Status: SHIPPED | OUTPATIENT
Start: 2024-01-31

## 2024-02-15 DIAGNOSIS — Z79.4 TYPE 2 DIABETES MELLITUS WITHOUT COMPLICATION, WITH LONG-TERM CURRENT USE OF INSULIN (HCC): ICD-10-CM

## 2024-02-15 DIAGNOSIS — E11.9 TYPE 2 DIABETES MELLITUS WITHOUT COMPLICATION, WITH LONG-TERM CURRENT USE OF INSULIN (HCC): ICD-10-CM

## 2024-02-19 DIAGNOSIS — R10.13 DYSPEPSIA: ICD-10-CM

## 2024-02-19 RX ORDER — LANSOPRAZOLE 30 MG/1
30 CAPSULE, DELAYED RELEASE ORAL DAILY
Qty: 90 CAPSULE | Refills: 1 | Status: SHIPPED | OUTPATIENT
Start: 2024-02-19

## 2024-02-22 DIAGNOSIS — Z79.4 TYPE 2 DIABETES MELLITUS WITHOUT COMPLICATION, WITH LONG-TERM CURRENT USE OF INSULIN (HCC): ICD-10-CM

## 2024-02-22 DIAGNOSIS — E11.9 TYPE 2 DIABETES MELLITUS WITHOUT COMPLICATION, WITH LONG-TERM CURRENT USE OF INSULIN (HCC): ICD-10-CM

## 2024-02-22 RX ORDER — DULAGLUTIDE 1.5 MG/.5ML
INJECTION, SOLUTION SUBCUTANEOUS
Refills: 0 | OUTPATIENT
Start: 2024-02-22

## 2024-02-23 DIAGNOSIS — Z79.4 TYPE 2 DIABETES MELLITUS WITHOUT COMPLICATION, WITH LONG-TERM CURRENT USE OF INSULIN (HCC): ICD-10-CM

## 2024-02-23 DIAGNOSIS — E11.9 TYPE 2 DIABETES MELLITUS WITHOUT COMPLICATION, WITH LONG-TERM CURRENT USE OF INSULIN (HCC): ICD-10-CM

## 2024-02-25 DIAGNOSIS — E55.9 VITAMIN D DEFICIENCY: ICD-10-CM

## 2024-02-25 RX ORDER — ERGOCALCIFEROL 1.25 MG/1
CAPSULE ORAL
Qty: 8 CAPSULE | Refills: 0 | Status: SHIPPED | OUTPATIENT
Start: 2024-02-25

## 2024-02-28 DIAGNOSIS — Z79.4 TYPE 2 DIABETES MELLITUS WITHOUT COMPLICATION, WITH LONG-TERM CURRENT USE OF INSULIN (HCC): ICD-10-CM

## 2024-02-28 DIAGNOSIS — E11.9 TYPE 2 DIABETES MELLITUS WITHOUT COMPLICATION, WITH LONG-TERM CURRENT USE OF INSULIN (HCC): ICD-10-CM

## 2024-02-28 NOTE — TELEPHONE ENCOUNTER
Patient has been trying to get his medication while away on a business trip however most pharmacys did not have it in stock. Patient needs it to be resent to his local pharmacy for when he gets home    Reason for call:   [x] Refill   [] Prior Auth  [] Other:     Office:   [x] PCP/Provider -   [] Specialty/Provider -     Medication:   Dulaglutide 1.5mh/0.5ml- inect 0.5ml under the skin every 7 days      [facility]: [facility]    Does the patient have enough for 3 days?   [] Yes   [x] No - Send as HP to POD

## 2024-03-04 DIAGNOSIS — R52 PAIN: ICD-10-CM

## 2024-03-07 RX ORDER — ALPRAZOLAM 0.5 MG/1
0.5 TABLET ORAL 2 TIMES DAILY
Qty: 60 TABLET | Refills: 0 | Status: SHIPPED | OUTPATIENT
Start: 2024-03-07

## 2024-04-02 ENCOUNTER — TELEPHONE (OUTPATIENT)
Dept: GASTROENTEROLOGY | Facility: MEDICAL CENTER | Age: 54
End: 2024-04-02

## 2024-04-05 DIAGNOSIS — Z79.4 TYPE 2 DIABETES MELLITUS WITHOUT COMPLICATION, WITH LONG-TERM CURRENT USE OF INSULIN (HCC): ICD-10-CM

## 2024-04-05 DIAGNOSIS — E11.9 TYPE 2 DIABETES MELLITUS WITHOUT COMPLICATION, WITH LONG-TERM CURRENT USE OF INSULIN (HCC): ICD-10-CM

## 2024-04-05 DIAGNOSIS — R52 PAIN: ICD-10-CM

## 2024-04-05 RX ORDER — ALPRAZOLAM 0.5 MG/1
0.5 TABLET ORAL 2 TIMES DAILY
Qty: 60 TABLET | Refills: 0 | Status: SHIPPED | OUTPATIENT
Start: 2024-04-05

## 2024-05-03 DIAGNOSIS — E55.9 VITAMIN D DEFICIENCY: ICD-10-CM

## 2024-05-09 DIAGNOSIS — Z79.4 TYPE 2 DIABETES MELLITUS WITHOUT COMPLICATION, WITH LONG-TERM CURRENT USE OF INSULIN (HCC): ICD-10-CM

## 2024-05-09 DIAGNOSIS — R52 PAIN: ICD-10-CM

## 2024-05-09 DIAGNOSIS — E11.9 TYPE 2 DIABETES MELLITUS WITHOUT COMPLICATION, WITH LONG-TERM CURRENT USE OF INSULIN (HCC): ICD-10-CM

## 2024-05-09 RX ORDER — ERGOCALCIFEROL 1.25 MG/1
CAPSULE ORAL
Qty: 8 CAPSULE | Refills: 0 | Status: SHIPPED | OUTPATIENT
Start: 2024-05-09

## 2024-05-09 RX ORDER — ALPRAZOLAM 0.5 MG/1
0.5 TABLET ORAL 2 TIMES DAILY
Qty: 60 TABLET | Refills: 0 | Status: SHIPPED | OUTPATIENT
Start: 2024-05-09

## 2024-05-09 NOTE — TELEPHONE ENCOUNTER
Patient needs updated blood work and has previously placed orders. Please contact patient to go for labs. Courtesy refill provided. Patient also needs updated appointment

## 2024-05-25 DIAGNOSIS — E03.9 HYPOTHYROIDISM, UNSPECIFIED TYPE: ICD-10-CM

## 2024-05-25 RX ORDER — LEVOTHYROXINE SODIUM 0.07 MG/1
75 TABLET ORAL EVERY MORNING
Qty: 90 TABLET | Refills: 1 | Status: SHIPPED | OUTPATIENT
Start: 2024-05-25

## 2024-05-31 DIAGNOSIS — E78.5 HYPERLIPIDEMIA, UNSPECIFIED HYPERLIPIDEMIA TYPE: ICD-10-CM

## 2024-05-31 RX ORDER — PRAVASTATIN SODIUM 40 MG
40 TABLET ORAL DAILY
Qty: 90 TABLET | Refills: 2 | Status: SHIPPED | OUTPATIENT
Start: 2024-05-31

## 2024-06-04 DIAGNOSIS — E11.9 TYPE 2 DIABETES MELLITUS WITHOUT COMPLICATION, WITH LONG-TERM CURRENT USE OF INSULIN (HCC): ICD-10-CM

## 2024-06-04 DIAGNOSIS — Z79.4 TYPE 2 DIABETES MELLITUS WITHOUT COMPLICATION, WITH LONG-TERM CURRENT USE OF INSULIN (HCC): ICD-10-CM

## 2024-06-12 DIAGNOSIS — E11.9 TYPE 2 DIABETES MELLITUS WITHOUT COMPLICATION, WITH LONG-TERM CURRENT USE OF INSULIN (HCC): ICD-10-CM

## 2024-06-12 DIAGNOSIS — E55.9 VITAMIN D DEFICIENCY: ICD-10-CM

## 2024-06-12 DIAGNOSIS — R10.13 DYSPEPSIA: ICD-10-CM

## 2024-06-12 DIAGNOSIS — F32.A DEPRESSION, UNSPECIFIED DEPRESSION TYPE: ICD-10-CM

## 2024-06-12 DIAGNOSIS — Z79.4 TYPE 2 DIABETES MELLITUS WITHOUT COMPLICATION, WITH LONG-TERM CURRENT USE OF INSULIN (HCC): ICD-10-CM

## 2024-06-12 RX ORDER — ERGOCALCIFEROL 1.25 MG/1
CAPSULE ORAL
Qty: 12 CAPSULE | Refills: 1 | Status: SHIPPED | OUTPATIENT
Start: 2024-06-12

## 2024-06-12 RX ORDER — LANSOPRAZOLE 30 MG/1
30 CAPSULE, DELAYED RELEASE ORAL DAILY
Qty: 90 CAPSULE | Refills: 1 | Status: SHIPPED | OUTPATIENT
Start: 2024-06-12

## 2024-06-12 RX ORDER — METFORMIN HYDROCHLORIDE 500 MG/1
500 TABLET, EXTENDED RELEASE ORAL
Qty: 90 TABLET | Refills: 1 | Status: SHIPPED | OUTPATIENT
Start: 2024-06-12

## 2024-06-12 RX ORDER — CITALOPRAM 20 MG/1
20 TABLET ORAL EVERY MORNING
Qty: 90 TABLET | Refills: 1 | Status: SHIPPED | OUTPATIENT
Start: 2024-06-12

## 2024-07-17 DIAGNOSIS — E78.5 HYPERLIPIDEMIA, UNSPECIFIED HYPERLIPIDEMIA TYPE: ICD-10-CM

## 2024-07-18 RX ORDER — PRAVASTATIN SODIUM 40 MG
40 TABLET ORAL DAILY
Qty: 100 TABLET | Refills: 0 | Status: SHIPPED | OUTPATIENT
Start: 2024-07-18

## 2024-08-15 ENCOUNTER — TELEPHONE (OUTPATIENT)
Age: 54
End: 2024-08-15

## 2024-08-15 DIAGNOSIS — R52 PAIN: ICD-10-CM

## 2024-08-15 DIAGNOSIS — I15.9 SECONDARY HYPERTENSION: ICD-10-CM

## 2024-08-15 RX ORDER — ALPRAZOLAM 0.5 MG
0.5 TABLET ORAL 2 TIMES DAILY
Qty: 60 TABLET | Refills: 0 | Status: SHIPPED | OUTPATIENT
Start: 2024-08-15

## 2024-08-15 NOTE — TELEPHONE ENCOUNTER
Pt called, he scheduled an appointment with the provider 9/23.  He would like to know if the provider can send the lab orders via email?      Pt currently resides in [place], he has been having some issues, is seeing an orthopedic doctor and could be having surgery soon, which is why he has not seen the provider recently.  He is struggling to find a PCP provider where he lives, in [place].  He is trying to get an appointment to see the same doctor his wife see's but as of now no appointments are available.

## 2024-08-16 RX ORDER — RAMIPRIL 10 MG/1
10 CAPSULE ORAL DAILY
Qty: 90 CAPSULE | Refills: 0 | Status: SHIPPED | OUTPATIENT
Start: 2024-08-16

## 2024-09-06 DIAGNOSIS — E03.9 HYPOTHYROIDISM, UNSPECIFIED TYPE: ICD-10-CM

## 2024-09-06 DIAGNOSIS — E11.9 TYPE 2 DIABETES MELLITUS WITHOUT COMPLICATION, WITH LONG-TERM CURRENT USE OF INSULIN (HCC): ICD-10-CM

## 2024-09-06 DIAGNOSIS — R10.13 DYSPEPSIA: ICD-10-CM

## 2024-09-06 DIAGNOSIS — F32.A DEPRESSION, UNSPECIFIED DEPRESSION TYPE: ICD-10-CM

## 2024-09-06 DIAGNOSIS — Z79.4 TYPE 2 DIABETES MELLITUS WITHOUT COMPLICATION, WITH LONG-TERM CURRENT USE OF INSULIN (HCC): ICD-10-CM

## 2024-09-07 RX ORDER — LANSOPRAZOLE 30 MG/1
30 CAPSULE, DELAYED RELEASE ORAL DAILY
Qty: 30 CAPSULE | Refills: 0 | Status: SHIPPED | OUTPATIENT
Start: 2024-09-07

## 2024-09-07 RX ORDER — METFORMIN HCL 500 MG
500 TABLET, EXTENDED RELEASE 24 HR ORAL
Qty: 30 TABLET | Refills: 0 | Status: SHIPPED | OUTPATIENT
Start: 2024-09-07

## 2024-09-07 RX ORDER — CITALOPRAM HYDROBROMIDE 20 MG/1
20 TABLET ORAL EVERY MORNING
Qty: 30 TABLET | Refills: 0 | Status: SHIPPED | OUTPATIENT
Start: 2024-09-07

## 2024-09-07 RX ORDER — LEVOTHYROXINE SODIUM 75 UG/1
75 TABLET ORAL EVERY MORNING
Qty: 30 TABLET | Refills: 0 | Status: SHIPPED | OUTPATIENT
Start: 2024-09-07

## 2024-09-12 DIAGNOSIS — R52 PAIN: ICD-10-CM

## 2024-09-12 RX ORDER — ALPRAZOLAM 0.5 MG
0.5 TABLET ORAL 2 TIMES DAILY
Qty: 60 TABLET | Refills: 0 | Status: SHIPPED | OUTPATIENT
Start: 2024-09-12

## 2024-09-23 ENCOUNTER — TELEPHONE (OUTPATIENT)
Age: 54
End: 2024-09-23

## 2024-09-23 DIAGNOSIS — Z12.5 PROSTATE CANCER SCREENING: ICD-10-CM

## 2024-09-23 DIAGNOSIS — E03.9 HYPOTHYROIDISM, UNSPECIFIED TYPE: ICD-10-CM

## 2024-09-23 DIAGNOSIS — E78.49 OTHER HYPERLIPIDEMIA: ICD-10-CM

## 2024-09-23 DIAGNOSIS — E11.9 TYPE 2 DIABETES MELLITUS WITHOUT COMPLICATION, WITHOUT LONG-TERM CURRENT USE OF INSULIN (HCC): Primary | ICD-10-CM

## 2024-09-23 DIAGNOSIS — M25.50 ARTHRALGIA, UNSPECIFIED JOINT: ICD-10-CM

## 2024-09-23 DIAGNOSIS — E55.9 VITAMIN D DEFICIENCY: ICD-10-CM

## 2024-09-23 NOTE — TELEPHONE ENCOUNTER
Please see patient  concern, I did print the active orders under labs, A1C,CMP, and Lipid. Please place orders if you would like any other testing.

## 2024-09-23 NOTE — TELEPHONE ENCOUNTER
Pt called and he said he was going to [place] to [place] his [place] before his physical. He would need updated lab orders and if you can please mail them to his address. Any further questions, please call him back at [phone] thank you

## 2024-10-05 LAB
25(OH)D3 SERPL-MCNC: 91 NG/ML (ref 30–100)
ALBUMIN SERPL-MCNC: 4.5 G/DL (ref 3.6–5.1)
ALBUMIN/CREAT UR: 12 MG/G CREAT
ALBUMIN/GLOB SERPL: 1.8 (CALC) (ref 1–2.5)
ALP SERPL-CCNC: 76 U/L (ref 35–144)
ALT SERPL-CCNC: 25 U/L (ref 9–46)
APPEARANCE UR: CLEAR
AST SERPL-CCNC: 16 U/L (ref 10–35)
BACTERIA UR QL AUTO: NORMAL /HPF
BILIRUB SERPL-MCNC: 0.3 MG/DL (ref 0.2–1.2)
BILIRUB UR QL STRIP: NEGATIVE
BUN SERPL-MCNC: 20 MG/DL (ref 7–25)
BUN/CREAT SERPL: ABNORMAL (CALC) (ref 6–22)
CALCIUM SERPL-MCNC: 9.5 MG/DL (ref 8.6–10.3)
CHLORIDE SERPL-SCNC: 102 MMOL/L (ref 98–110)
CHOLEST SERPL-MCNC: 187 MG/DL
CHOLEST/HDLC SERPL: 4.3 (CALC)
CO2 SERPL-SCNC: 32 MMOL/L (ref 20–32)
COLOR UR: YELLOW
CREAT SERPL-MCNC: 1.21 MG/DL (ref 0.7–1.3)
CREAT UR-MCNC: 82 MG/DL (ref 20–320)
GFR/BSA.PRED SERPLBLD CYS-BASED-ARV: 71 ML/MIN/1.73M2
GLOBULIN SER CALC-MCNC: 2.5 G/DL (CALC) (ref 1.9–3.7)
GLUCOSE SERPL-MCNC: 116 MG/DL (ref 65–99)
GLUCOSE UR QL STRIP: NEGATIVE
HBA1C MFR BLD: 7.2 % OF TOTAL HGB
HDLC SERPL-MCNC: 43 MG/DL
HGB UR QL STRIP: NEGATIVE
HYALINE CASTS #/AREA URNS LPF: NORMAL /LPF
KETONES UR QL STRIP: NEGATIVE
LDLC SERPL CALC-MCNC: 109 MG/DL (CALC)
LEUKOCYTE ESTERASE UR QL STRIP: NEGATIVE
MICROALBUMIN UR-MCNC: 1 MG/DL
NITRITE UR QL STRIP: NEGATIVE
NONHDLC SERPL-MCNC: 144 MG/DL (CALC)
PH UR STRIP: 5.5 [PH] (ref 5–8)
POTASSIUM SERPL-SCNC: 4.6 MMOL/L (ref 3.5–5.3)
PROT SERPL-MCNC: 7 G/DL (ref 6.1–8.1)
PROT UR QL STRIP: NEGATIVE
PSA SERPL-MCNC: 0.24 NG/ML
RBC #/AREA URNS HPF: NORMAL /HPF
SODIUM SERPL-SCNC: 142 MMOL/L (ref 135–146)
SP GR UR STRIP: 1.01 (ref 1–1.03)
SQUAMOUS #/AREA URNS HPF: NORMAL /HPF
TRIGL SERPL-MCNC: 230 MG/DL
TSH SERPL-ACNC: 2.73 MIU/L (ref 0.4–4.5)
URATE SERPL-MCNC: 8.4 MG/DL (ref 4–8)
WBC #/AREA URNS HPF: NORMAL /HPF

## 2024-10-11 ENCOUNTER — OFFICE VISIT (OUTPATIENT)
Age: 54
End: 2024-10-11
Payer: COMMERCIAL

## 2024-10-11 VITALS
HEIGHT: 69 IN | TEMPERATURE: 97.7 F | SYSTOLIC BLOOD PRESSURE: 140 MMHG | BODY MASS INDEX: 46.65 KG/M2 | WEIGHT: 315 LBS | OXYGEN SATURATION: 97 % | DIASTOLIC BLOOD PRESSURE: 88 MMHG | HEART RATE: 100 BPM

## 2024-10-11 DIAGNOSIS — E78.49 OTHER HYPERLIPIDEMIA: ICD-10-CM

## 2024-10-11 DIAGNOSIS — Z12.11 COLON CANCER SCREENING: ICD-10-CM

## 2024-10-11 DIAGNOSIS — E03.9 HYPOTHYROIDISM, UNSPECIFIED TYPE: ICD-10-CM

## 2024-10-11 DIAGNOSIS — E78.5 HYPERLIPIDEMIA, UNSPECIFIED HYPERLIPIDEMIA TYPE: ICD-10-CM

## 2024-10-11 DIAGNOSIS — Z00.00 ANNUAL PHYSICAL EXAM: Primary | ICD-10-CM

## 2024-10-11 DIAGNOSIS — F32.A DEPRESSION, UNSPECIFIED DEPRESSION TYPE: ICD-10-CM

## 2024-10-11 DIAGNOSIS — E11.9 TYPE 2 DIABETES MELLITUS WITHOUT COMPLICATION, WITHOUT LONG-TERM CURRENT USE OF INSULIN (HCC): ICD-10-CM

## 2024-10-11 PROCEDURE — 99214 OFFICE O/P EST MOD 30 MIN: CPT | Performed by: INTERNAL MEDICINE

## 2024-10-11 PROCEDURE — 99396 PREV VISIT EST AGE 40-64: CPT | Performed by: INTERNAL MEDICINE

## 2024-10-11 NOTE — PROGRESS NOTES
Assessment/Plan:           Problem List Items Addressed This Visit       Hyperlipemia    Hypothyroidism    Type 2 diabetes mellitus without complication, without long-term current use of insulin (HCC)     Other Visit Diagnoses       Annual physical exam    -  Primary    Depression, unspecified depression type        Colon cancer screening                  Subjective:      Patient ID: Andre Almeida is a 53 y.o. male.    HPI  Patient with diabetes mellitus hypertension hyperlipidemia is here to follow-up on chronic medical problems as well as annual physical.  He was seen last year in the office.  He has moved to [place] and will be establishing care there.  I promised I would give him 3-month supply of medication.  He is on Trulicity and metformin.  Metformin A1c is 7.2 now dropping from 8.4.  I encouraged him to discuss need for GLP-1 as well as SGLT2 inhibitors for his regimen as well help improve his glycemic profile even further.  Patient blood pressure is slightly on the higher side as he was traveling.  He is on ramipril continue current regimen.  I am glad to see his microalbumin is negative.  TSH is at target continue with 75 mcg of Synthroid.  He is on Celexa 20 mg along with Xanax 0.5 for his depression and anxiety which she is doing well.  Again I will prescribe him only for 3-month supply before he gets established with his primary.  His LDL is suboptimal on pravastatin 40 mg a day.  He had relative intolerance to other statins.    As far as his health preventative is concerned he will get his flu shot with his wife at a local pharmacy he states.  He has not had colonoscopy and EGD but will be establishing care and understands it is important that he would do so.  His PSA was good.  He should get shingles vaccinations as well.  The following portions of the patient's history were reviewed and updated as appropriate: allergies, current medications, past family history, past medical history, past social  "history, past surgical history, and problem list.    Review of Systems      Objective:      /88 (BP Location: Right arm, Patient Position: Sitting, Cuff Size: Large)   Pulse 100   Temp 97.7 °F (36.5 °C) (Tympanic)   Ht 5' 9\" (1.753 m)   Wt (!) 166 kg (367 lb)   SpO2 97%   BMI 54.20 kg/m²          Physical Exam  Cardiovascular:      Rate and Rhythm: Normal rate and regular rhythm.      Pulses: no weak pulses.           Dorsalis pedis pulses are 2+ on the right side and 2+ on the left side.      Heart sounds: No murmur heard.  Pulmonary:      Effort: Pulmonary effort is normal. No respiratory distress.      Breath sounds: Normal breath sounds. No wheezing or rales.   Abdominal:      General: Bowel sounds are normal. There is no distension.      Tenderness: There is no abdominal tenderness. There is no guarding.   Musculoskeletal:      Right lower leg: No edema.      Left lower leg: No edema.   Feet:      Right foot:      Skin integrity: Dry skin present. No ulcer, skin breakdown, erythema, warmth or callus.      Left foot:      Skin integrity: Dry skin present. No ulcer, skin breakdown, erythema, warmth or callus.   Neurological:      Mental Status: He is alert.   Psychiatric:         Mood and Affect: Mood normal.         Behavior: Behavior normal.               Depression Screening and Follow-up Plan: Patient was screened for depression during today's encounter. They screened negative with a PHQ-9 score of 0.     Diabetic Foot Exam    Patient's shoes and socks removed.    Right Foot/Ankle   Right Foot Inspection  Skin Exam: skin normal, skin intact and dry skin. No warmth, no callus, no erythema, no maceration, no abnormal color, no pre-ulcer, no ulcer and no callus.     Toe Exam: ROM and strength within normal limits.     Sensory   Monofilament testing: intact    Vascular  The right DP pulse is 2+.     Left Foot/Ankle  Left Foot Inspection  Skin Exam: skin normal, skin intact and dry skin. No warmth, no " erythema, no maceration, normal color, no pre-ulcer, no ulcer and no callus.     Toe Exam: ROM and strength within normal limits.     Sensory   Monofilament testing: intact    Vascular  The left DP pulse is 2+.     Assign Risk Category  No deformity present  No loss of protective sensation  No weak pulses  Risk: 0

## 2024-10-16 DIAGNOSIS — R52 PAIN: ICD-10-CM

## 2024-10-16 DIAGNOSIS — F32.A DEPRESSION, UNSPECIFIED DEPRESSION TYPE: ICD-10-CM

## 2024-10-16 DIAGNOSIS — E11.9 TYPE 2 DIABETES MELLITUS WITHOUT COMPLICATION, WITH LONG-TERM CURRENT USE OF INSULIN (HCC): ICD-10-CM

## 2024-10-16 DIAGNOSIS — Z79.4 TYPE 2 DIABETES MELLITUS WITHOUT COMPLICATION, WITH LONG-TERM CURRENT USE OF INSULIN (HCC): ICD-10-CM

## 2024-10-17 RX ORDER — CITALOPRAM HYDROBROMIDE 20 MG/1
20 TABLET ORAL EVERY MORNING
Qty: 90 TABLET | Refills: 1 | Status: SHIPPED | OUTPATIENT
Start: 2024-10-17

## 2024-10-17 RX ORDER — ALPRAZOLAM 0.5 MG
0.5 TABLET ORAL 2 TIMES DAILY
Qty: 60 TABLET | Refills: 0 | Status: SHIPPED | OUTPATIENT
Start: 2024-10-17

## 2024-10-21 DIAGNOSIS — E03.9 HYPOTHYROIDISM, UNSPECIFIED TYPE: ICD-10-CM

## 2024-10-22 RX ORDER — LEVOTHYROXINE SODIUM 75 UG/1
75 TABLET ORAL EVERY MORNING
Qty: 90 TABLET | Refills: 1 | Status: SHIPPED | OUTPATIENT
Start: 2024-10-22

## 2024-11-22 DIAGNOSIS — Z79.4 TYPE 2 DIABETES MELLITUS WITHOUT COMPLICATION, WITH LONG-TERM CURRENT USE OF INSULIN (HCC): ICD-10-CM

## 2024-11-22 DIAGNOSIS — E11.9 TYPE 2 DIABETES MELLITUS WITHOUT COMPLICATION, WITH LONG-TERM CURRENT USE OF INSULIN (HCC): ICD-10-CM

## 2024-11-23 RX ORDER — METFORMIN HYDROCHLORIDE 500 MG/1
500 TABLET, EXTENDED RELEASE ORAL
Qty: 90 TABLET | Refills: 1 | Status: SHIPPED | OUTPATIENT
Start: 2024-11-23

## 2024-11-25 DIAGNOSIS — E78.5 HYPERLIPIDEMIA, UNSPECIFIED HYPERLIPIDEMIA TYPE: ICD-10-CM

## 2024-11-25 DIAGNOSIS — R52 PAIN: ICD-10-CM

## 2024-11-26 RX ORDER — PRAVASTATIN SODIUM 40 MG
40 TABLET ORAL DAILY
Qty: 100 TABLET | Refills: 1 | Status: SHIPPED | OUTPATIENT
Start: 2024-11-26

## 2024-11-26 RX ORDER — ALPRAZOLAM 0.5 MG
0.5 TABLET ORAL 2 TIMES DAILY
Qty: 60 TABLET | Refills: 0 | Status: SHIPPED | OUTPATIENT
Start: 2024-11-26

## 2025-01-09 DIAGNOSIS — R52 PAIN: ICD-10-CM

## 2025-01-09 DIAGNOSIS — E55.9 VITAMIN D DEFICIENCY: ICD-10-CM

## 2025-01-09 RX ORDER — ALPRAZOLAM 0.5 MG
0.5 TABLET ORAL 2 TIMES DAILY
Qty: 60 TABLET | Refills: 0 | Status: SHIPPED | OUTPATIENT
Start: 2025-01-09

## 2025-01-10 DIAGNOSIS — E11.9 TYPE 2 DIABETES MELLITUS WITHOUT COMPLICATION, WITH LONG-TERM CURRENT USE OF INSULIN (HCC): Primary | ICD-10-CM

## 2025-01-10 DIAGNOSIS — Z79.4 TYPE 2 DIABETES MELLITUS WITHOUT COMPLICATION, WITH LONG-TERM CURRENT USE OF INSULIN (HCC): Primary | ICD-10-CM

## 2025-02-07 ENCOUNTER — OFFICE VISIT (OUTPATIENT)
Dept: ENDOCRINOLOGY | Facility: HOSPITAL | Age: 55
End: 2025-02-07
Payer: COMMERCIAL

## 2025-02-07 VITALS
BODY MASS INDEX: 46.65 KG/M2 | WEIGHT: 315 LBS | HEART RATE: 96 BPM | HEIGHT: 69 IN | OXYGEN SATURATION: 97 % | SYSTOLIC BLOOD PRESSURE: 130 MMHG | DIASTOLIC BLOOD PRESSURE: 80 MMHG

## 2025-02-07 DIAGNOSIS — E11.29 MICROALBUMINURIA DUE TO TYPE 2 DIABETES MELLITUS  (HCC): ICD-10-CM

## 2025-02-07 DIAGNOSIS — I10 BENIGN ESSENTIAL HYPERTENSION: ICD-10-CM

## 2025-02-07 DIAGNOSIS — E11.9 TYPE 2 DIABETES MELLITUS WITHOUT COMPLICATION, WITHOUT LONG-TERM CURRENT USE OF INSULIN (HCC): Primary | ICD-10-CM

## 2025-02-07 DIAGNOSIS — E11.9 TYPE 2 DIABETES MELLITUS WITHOUT COMPLICATION, WITH LONG-TERM CURRENT USE OF INSULIN (HCC): ICD-10-CM

## 2025-02-07 DIAGNOSIS — Z79.4 TYPE 2 DIABETES MELLITUS WITHOUT COMPLICATION, WITH LONG-TERM CURRENT USE OF INSULIN (HCC): ICD-10-CM

## 2025-02-07 DIAGNOSIS — E78.2 MIXED HYPERLIPIDEMIA: ICD-10-CM

## 2025-02-07 DIAGNOSIS — E03.9 ACQUIRED HYPOTHYROIDISM: ICD-10-CM

## 2025-02-07 DIAGNOSIS — R80.9 MICROALBUMINURIA DUE TO TYPE 2 DIABETES MELLITUS  (HCC): ICD-10-CM

## 2025-02-07 DIAGNOSIS — E11.42 DIABETIC POLYNEUROPATHY ASSOCIATED WITH TYPE 2 DIABETES MELLITUS (HCC): ICD-10-CM

## 2025-02-07 PROCEDURE — 99244 OFF/OP CNSLTJ NEW/EST MOD 40: CPT | Performed by: INTERNAL MEDICINE

## 2025-02-07 RX ORDER — TESTOSTERONE CYPIONATE 200 MG/ML
50 INJECTION, SOLUTION INTRAMUSCULAR ONCE
COMMUNITY

## 2025-02-07 RX ORDER — METFORMIN HYDROCHLORIDE 500 MG/1
TABLET, EXTENDED RELEASE ORAL
Qty: 270 TABLET | Refills: 1 | Status: SHIPPED | OUTPATIENT
Start: 2025-02-07

## 2025-02-07 RX ORDER — ANASTROZOLE 1 MG/1
1 TABLET ORAL DAILY
COMMUNITY

## 2025-02-07 NOTE — PROGRESS NOTES
2/7/2025    Assessment & Plan      Diagnoses and all orders for this visit:    Type 2 diabetes mellitus without complication, without long-term current use of insulin ([facility])  -     Comprehensive metabolic panel; Future  -     TSH, 3rd generation; Future  -     CBC and differential; Future  -     Lipid Panel with Direct LDL reflex; Future  -     Hemoglobin A1c (w/out EAG); Future  -     Comprehensive metabolic panel  -     TSH, 3rd generation  -     CBC and differential  -     Lipid Panel with Direct LDL reflex  -     Hemoglobin A1c (w/out EAG)    Type 2 diabetes mellitus without complication, with long-term current use of insulin ([facility])  -     Ambulatory Referral to Endocrinology  -     metFORMIN (GLUCOPHAGE-XR) 500 mg 24 hr tablet; 2 in am and 1 in pm  -     Dulaglutide 1.5 MG/0.5ML SOAJ; 1.5 mg injected once a week    Diabetic polyneuropathy associated with type 2 diabetes mellitus (HCC)  -     Comprehensive metabolic panel; Future  -     TSH, 3rd generation; Future  -     CBC and differential; Future  -     Lipid Panel with Direct LDL reflex; Future  -     Hemoglobin A1c (w/out EAG); Future  -     Comprehensive metabolic panel  -     TSH, 3rd generation  -     CBC and differential  -     Lipid Panel with Direct LDL reflex  -     Hemoglobin A1c (w/out EAG)    Acquired hypothyroidism  -     Comprehensive metabolic panel; Future  -     TSH, 3rd generation; Future  -     CBC and differential; Future  -     Lipid Panel with Direct LDL reflex; Future  -     Hemoglobin A1c (w/out EAG); Future  -     Comprehensive metabolic panel  -     TSH, 3rd generation  -     CBC and differential  -     Lipid Panel with Direct LDL reflex  -     Hemoglobin A1c (w/out EAG)    Microalbuminuria due to type 2 diabetes mellitus  (HCC)  -     Comprehensive metabolic panel; Future  -     TSH, 3rd generation; Future  -     CBC and differential; Future  -     Lipid Panel with Direct LDL reflex; Future  -     Hemoglobin A1c (w/out EAG); Future  -      Comprehensive metabolic panel  -     TSH, 3rd generation  -     CBC and differential  -     Lipid Panel with Direct LDL reflex  -     Hemoglobin A1c (w/out EAG)    Mixed hyperlipidemia  -     Comprehensive metabolic panel; Future  -     TSH, 3rd generation; Future  -     CBC and differential; Future  -     Lipid Panel with Direct LDL reflex; Future  -     Hemoglobin A1c (w/out EAG); Future  -     Comprehensive metabolic panel  -     TSH, 3rd generation  -     CBC and differential  -     Lipid Panel with Direct LDL reflex  -     Hemoglobin A1c (w/out EAG)    Benign essential hypertension  -     Comprehensive metabolic panel; Future  -     TSH, 3rd generation; Future  -     CBC and differential; Future  -     Lipid Panel with Direct LDL reflex; Future  -     Hemoglobin A1c (w/out EAG); Future  -     Comprehensive metabolic panel  -     TSH, 3rd generation  -     CBC and differential  -     Lipid Panel with Direct LDL reflex  -     Hemoglobin A1c (w/out EAG)    Other orders  -     anastrozole (ARIMIDEX) 1 mg tablet; Take 1 mg by mouth daily Taking 1/2 a tablet every 3 days  -     testosterone cypionate (DEPO-TESTOSTERONE) 200 mg/mL SOLN; Inject 50 mg into a muscle once Injecting 0.5 ml every 3 days        Assessment/Plan:  Type 2 Diabetes  HbA1C of 9.5 and blood sugars ranging from 200-300s shows uncontrolled diabetes on Trulicity 1.5mg 1x weekly and metformin XR 500mg daily. Patient is compliant with carbohydrate restricted diet. As patient has tolerated Trulicity and metformin well, will increase metformin to 1000mg daily for 2 weeks then to 1000mg in the morning and 500mg in the evening if tolerated. Will hold off on initiating therapy with SGLT2 inhibitor at this time due to patient's polyuria and nocturia but can consider in the future if polyuria improves. Patient will send blood sugar logs after 2 weeks on the 1500mg of metformin daily. Trulicity dose to remain 1.5mg 1x weekly.     Diabetic  Polyneuropathy  Diabetic foot exam performed at this visit. Decreased sensation to vibration left greater than right. Decreased sensation to monofilament of right 5th digit and heel and left 4th and 5th digit.      Follow up in 4-5 months with blood work    CC: Diabetes Consult    History of Present Illness     HPI: Andre Almeida is a 54 y.o. year old male with type 2 diabetes for 6 years.  He is on oral agents at home and takes Trulicity 1.5mg 1x weekly and metformin XR 500mg daily. He has polyuria, polydipsia, nocturia and denies blurry vision.  He denies nephropathy, retinopathy, heart attack, and stroke but does admit to neuropathy.      After initial diabetes diagnosis, he was put on Trulicity and metformin immediate release. He was switched to metformin extended release and two years ago Trulicity was increased to 1.5mg 1x weekly. He has tolerated both medications well and has never been on other diabetes medications or on higher doses of either medication.    Hypoglycemic episodes: Yes 2x per year, feel like dying,shaking. Around mid day.and will eat some chocolate or drink juice to bring sugar back up.     The patient's last eye exam was 2 years ago.  The patient's last foot exam was 9 months ago.    Blood Sugar/Glucometer/Pump/CGM review: Tests 3 times a day, one hour after waking, around 11, and in the evening. Blood sugars average 260 and ranges 200-300s, once in the 400s.    His diet is very limited as he also has gout. He strictly avoids sugar and carbs and primarily eats protein. He does not exercise. He has lost approximately 18 lbs in the last month.    He has nocturia and gets up up to 14 times per night to urinate.     Strong family history of diabetes on his father's side of the family.      Review of Systems   Constitutional:  Positive for appetite change, fatigue and unexpected weight change.   Eyes:  Negative for visual disturbance.   Respiratory:  Negative for chest tightness and shortness of  breath.    Cardiovascular:  Negative for chest pain.   Gastrointestinal:  Negative for constipation and diarrhea.   Endocrine: Positive for polydipsia, polyphagia and polyuria.   Skin:  Negative for rash.   Neurological:  Positive for numbness.       Historical Information   Past Medical History:   Diagnosis Date    Hypertension      Past Surgical History:   Procedure Laterality Date    REPLACEMENT TOTAL KNEE      SHOULDER SURGERY       Social History   Social History     Substance and Sexual Activity   Alcohol Use No     Social History     Substance and Sexual Activity   Drug Use Never     Social History     Tobacco Use   Smoking Status Never   Smokeless Tobacco Current    Types: Snuff     Family History:   Family History   Problem Relation Age of Onset    Diabetes Father     Diabetes type II Father        Meds/Allergies   Current Outpatient Medications   Medication Sig Dispense Refill    ALPRAZolam (XANAX) 0.5 mg tablet Take 1 tablet (0.5 mg total) by mouth 2 (two) times a day 60 tablet 0    anastrozole (ARIMIDEX) 1 mg tablet Take 1 mg by mouth daily Taking 1/2 a tablet every 3 days      citalopram (CeleXA) 20 mg tablet Take 1 tablet (20 mg total) by mouth every morning 90 tablet 1    dulaglutide (Trulicity) 1.5 MG/0.5ML injection Inject 0.5 mL (1.5 mg total) under the skin every 7 days 6 mL 1    lansoprazole (PREVACID) 30 mg capsule Take 1 capsule (30 mg total) by mouth daily 30 capsule 0    levothyroxine 75 mcg tablet TAKE 1 TABLET BY MOUTH EVERY DAY IN THE MORNING 90 tablet 1    metFORMIN (GLUCOPHAGE-XR) 500 mg 24 hr tablet TAKE 1 TABLET BY MOUTH EVERY DAY WITH DINNER 90 tablet 1    pravastatin (PRAVACHOL) 40 mg tablet Take 1 tablet (40 mg total) by mouth daily 100 tablet 1    ramipril (ALTACE) 10 MG capsule Take 1 capsule (10 mg total) by mouth daily 90 capsule 0    testosterone cypionate (DEPO-TESTOSTERONE) 200 mg/mL SOLN Inject 50 mg into a muscle once Injecting 0.5 ml every 3 days       No current  "facility-administered medications for this visit.     No Known Allergies    Objective   Vitals: Blood pressure 130/80, pulse 96, height 5' 9\" (1.753 m), weight (!) 168 kg (370 lb 6.4 oz), SpO2 97%.  Invasive Devices       None                   Physical Exam  Constitutional:       General: He is not in acute distress.     Appearance: Normal appearance.   HENT:      Head: Normocephalic and atraumatic.      Right Ear: External ear normal.      Left Ear: External ear normal.      Nose: Nose normal.   Eyes:      Extraocular Movements: Extraocular movements intact.   Neck:      Vascular: No carotid bruit.   Cardiovascular:      Rate and Rhythm: Normal rate and regular rhythm.      Pulses: no weak pulses.           Dorsalis pedis pulses are 2+ on the right side and 2+ on the left side.        Posterior tibial pulses are 2+ on the right side and 2+ on the left side.      Heart sounds: Normal heart sounds.   Pulmonary:      Effort: Pulmonary effort is normal.      Breath sounds: Normal breath sounds.   Musculoskeletal:      Cervical back: No tenderness.      Right lower leg: No edema.      Left lower leg: No edema.        Feet:       Comments: No tremor of outstretched hands   Feet:      Right foot:      Skin integrity: No ulcer, skin breakdown, erythema, warmth, callus or dry skin.      Left foot:      Skin integrity: No ulcer, skin breakdown, erythema, warmth, callus or dry skin.   Lymphadenopathy:      Cervical: No cervical adenopathy.   Skin:     General: Skin is warm and dry.   Neurological:      General: No focal deficit present.      Mental Status: He is alert and oriented to person, place, and time.      Deep Tendon Reflexes:      Reflex Scores:       Patellar reflexes are 2+ on the right side and 2+ on the left side.  Psychiatric:         Mood and Affect: Mood normal.         Thought Content: Thought content normal.       Diabetic Foot Exam    Patient's shoes and socks removed.    Right Foot/Ankle   Right Foot " "Inspection  Skin Exam: skin normal and skin intact. No dry skin, no warmth, no callus, no erythema, no maceration, no abnormal color, no pre-ulcer, no ulcer and no callus.     Toe Exam: No swelling, no tenderness, erythema and  no right toe deformity    Sensory   Vibration: diminished  Monofilament testing: diminished    Vascular  The right DP pulse is 2+. The right PT pulse is 2+.     Left Foot/Ankle  Left Foot Inspection  Skin Exam: skin normal and skin intact. No dry skin, no warmth, no erythema, no maceration, normal color, no pre-ulcer, no ulcer and no callus.     Toe Exam: No swelling, no tenderness, no erythema and no left toe deformity.     Sensory   Vibration: diminished  Monofilament testing: diminished    Vascular  The left DP pulse is 2+. The left PT pulse is 2+.     Assign Risk Category  No deformity present  Loss of protective sensation  No weak pulses  Risk: 1             The history was obtained from the review of the chart and from the patient.    Lab Results:    Most recent Alc is  Lab Results   Component Value Date    HGBA1C 7.2 (H) 10/04/2024           No components found for: \"HA1C\"  No components found for: \"GLU\"    Lab Results   Component Value Date    CREATININE 1.21 10/04/2024    CREATININE 1.22 10/26/2023    CREATININE 1.18 05/16/2023    BUN 20 10/04/2024    K 4.6 10/04/2024     10/04/2024    CO2 32 10/04/2024     eGFR   Date Value Ref Range Status   10/04/2024 71 > OR = 60 mL/min/1.73m2 Final   08/09/2018 59 ml/min/1.73sq m Final     No components found for: \"MALBCRER\"    Lab Results   Component Value Date    HDL 43 10/04/2024    TRIG 230 (H) 10/04/2024       Lab Results   Component Value Date    ALT 25 10/04/2024    AST 16 10/04/2024    ALKPHOS 76 10/04/2024       No results found for: \"TSH\", \"FREET4\", \"TSI\"    Recent Results (from the past 60 weeks)   Lipid panel    Collection Time: 10/04/24  1:15 PM   Result Value Ref Range    Total Cholesterol 187 <200 mg/dL    HDL 43 > OR = 40 " mg/dL    Triglycerides 230 (H) <150 mg/dL    LDL Calculated 109 (H) mg/dL (calc)    Chol HDLC Ratio 4.3 <5.0 (calc)    Non-HDL Cholesterol 144 (H) <130 mg/dL (calc)   Microalbumin, Random Urine (W/Creatinine)    Collection Time: 10/04/24  1:15 PM   Result Value Ref Range    Creatinine, Urine 82 20 - 320 mg/dL    Albumin,U,Random 1.0 See Note: mg/dL    Microalb/Creat Ratio 12 <30 mg/g creat   Uric acid    Collection Time: 10/04/24  1:15 PM   Result Value Ref Range    Uric Acid 8.4 (H) 4.0 - 8.0 mg/dL   Comprehensive metabolic panel    Collection Time: 10/04/24  1:15 PM   Result Value Ref Range    Glucose, Random 116 (H) 65 - 99 mg/dL    BUN 20 7 - 25 mg/dL    Creatinine 1.21 0.70 - 1.30 mg/dL    eGFR 71 > OR = 60 mL/min/1.73m2    SL AMB BUN/CREATININE RATIO SEE NOTE: 6 - 22 (calc)    Sodium 142 135 - 146 mmol/L    Potassium 4.6 3.5 - 5.3 mmol/L    Chloride 102 98 - 110 mmol/L    CO2 32 20 - 32 mmol/L    Calcium 9.5 8.6 - 10.3 mg/dL    Protein, Total 7.0 6.1 - 8.1 g/dL    Albumin 4.5 3.6 - 5.1 g/dL    Globulin 2.5 1.9 - 3.7 g/dL (calc)    Albumin/Globulin Ratio 1.8 1.0 - 2.5 (calc)    TOTAL BILIRUBIN 0.3 0.2 - 1.2 mg/dL    Alkaline Phosphatase 76 35 - 144 U/L    AST 16 10 - 35 U/L    ALT 25 9 - 46 U/L   Urinalysis with microscopic    Collection Time: 10/04/24  1:15 PM   Result Value Ref Range    Color UA YELLOW YELLOW    Urine Appearance CLEAR CLEAR    Specific Gravity 1.015 1.001 - 1.035    Ph 5.5 5.0 - 8.0    Glucose, Urine NEGATIVE NEGATIVE    Bilirubin, Urine NEGATIVE NEGATIVE    Ketone, Urine NEGATIVE NEGATIVE    Blood, Urine NEGATIVE NEGATIVE    Protein, Urine NEGATIVE NEGATIVE    Nitrites Urine NEGATIVE NEGATIVE    Leukocyte Esterase NEGATIVE NEGATIVE    SL AMB WBC, URINE NONE SEEN < OR = 5 /HPF    RBC, Urine NONE SEEN < OR = 2 /HPF    Squamous Epithelial Cells NONE SEEN < OR = 5 /HPF    Bacteria, UA NONE SEEN NONE SEEN /HPF    Hyaline Casts NONE SEEN NONE SEEN /LPF    Comment(s)     PSA, Total Screen     "Collection Time: 10/04/24  1:15 PM   Result Value Ref Range    Prostate Specific Antigen Total 0.24 < OR = 4.00 ng/mL   TSH, 3rd generation with Free T4 reflex    Collection Time: 10/04/24  1:15 PM   Result Value Ref Range    TSH W/RFX TO FREE T4 2.73 0.40 - 4.50 mIU/L   Vitamin D 25 hydroxy    Collection Time: 10/04/24  1:15 PM   Result Value Ref Range    Vitamin D, 25-Hydroxy, Serum 91 30 - 100 ng/mL   Hemoglobin A1c (w/out EAG)    Collection Time: 10/04/24  1:15 PM   Result Value Ref Range    Hemoglobin A1C 7.2 (H) <5.7 % of total Hgb         No future appointments.    Portions of the record may have been created with voice recognition software. Occasional wrong word or \"sound a like\" substitutions may have occurred due to the inherent limitations of voice recognition software. Read the chart carefully and recognize, using context, where substitutions have occurred.   "

## 2025-02-07 NOTE — PATIENT INSTRUCTIONS
Hgba1c is 9.5%. this is uncontrolled.     Continue the same Trulicity.     Let's try increasing the metformin  mg 2 in am for 2 weeks and then increase to 2 in am and 1 in pm.     Contact me with blood sugars 2 weeks after going to the 3 daily.     We may have to increase the trulicity or add a 3rd diabetes medication.     Call if significant diarrhea.     Continue with your diet. Work on some exercise as able.     I agree with at least getting a sleep study to evaluate for sleep apnea.     Follow up in 4-5 months with blood work.

## 2025-03-04 DIAGNOSIS — E78.5 HYPERLIPIDEMIA, UNSPECIFIED HYPERLIPIDEMIA TYPE: ICD-10-CM

## 2025-03-04 RX ORDER — PRAVASTATIN SODIUM 40 MG
40 TABLET ORAL DAILY
Qty: 100 TABLET | Refills: 1 | Status: SHIPPED | OUTPATIENT
Start: 2025-03-04

## 2025-03-05 DIAGNOSIS — R10.13 DYSPEPSIA: ICD-10-CM

## 2025-03-06 RX ORDER — LANSOPRAZOLE 30 MG/1
30 CAPSULE, DELAYED RELEASE ORAL DAILY
Qty: 90 CAPSULE | Refills: 1 | Status: SHIPPED | OUTPATIENT
Start: 2025-03-06

## 2025-04-12 DIAGNOSIS — I15.9 SECONDARY HYPERTENSION: ICD-10-CM

## 2025-04-14 DIAGNOSIS — F32.A DEPRESSION, UNSPECIFIED DEPRESSION TYPE: ICD-10-CM

## 2025-04-14 RX ORDER — RAMIPRIL 10 MG/1
10 CAPSULE ORAL DAILY
Qty: 90 CAPSULE | Refills: 0 | OUTPATIENT
Start: 2025-04-14

## 2025-04-15 RX ORDER — CITALOPRAM HYDROBROMIDE 20 MG/1
20 TABLET ORAL EVERY MORNING
Qty: 90 TABLET | Refills: 1 | OUTPATIENT
Start: 2025-04-15

## 2025-04-17 DIAGNOSIS — E11.9 TYPE 2 DIABETES MELLITUS WITHOUT COMPLICATION, WITHOUT LONG-TERM CURRENT USE OF INSULIN (HCC): Primary | ICD-10-CM

## 2025-04-17 RX ORDER — DULAGLUTIDE 3 MG/.5ML
0.5 INJECTION, SOLUTION SUBCUTANEOUS WEEKLY
Qty: 6 ML | Refills: 0 | Status: SHIPPED | OUTPATIENT
Start: 2025-04-17

## 2025-05-09 DIAGNOSIS — E11.9 TYPE 2 DIABETES MELLITUS WITHOUT COMPLICATION, WITHOUT LONG-TERM CURRENT USE OF INSULIN (HCC): Primary | ICD-10-CM

## 2025-06-02 DIAGNOSIS — E03.9 HYPOTHYROIDISM, UNSPECIFIED TYPE: ICD-10-CM

## 2025-06-03 RX ORDER — LEVOTHYROXINE SODIUM 75 UG/1
75 TABLET ORAL EVERY MORNING
Qty: 90 TABLET | Refills: 1 | Status: SHIPPED | OUTPATIENT
Start: 2025-06-03

## 2025-06-27 DIAGNOSIS — E11.9 TYPE 2 DIABETES MELLITUS WITHOUT COMPLICATION, WITHOUT LONG-TERM CURRENT USE OF INSULIN (HCC): Primary | ICD-10-CM

## 2025-07-18 DIAGNOSIS — E11.9 TYPE 2 DIABETES MELLITUS WITHOUT COMPLICATION, WITHOUT LONG-TERM CURRENT USE OF INSULIN (HCC): ICD-10-CM

## 2025-07-22 NOTE — TELEPHONE ENCOUNTER
Patient was last seen 2-7-25.    Left message for him to call to schedule.  Will need to have labs done prior to the next appointment.

## 2025-07-23 RX ORDER — DULAGLUTIDE 3 MG/.5ML
0.5 INJECTION, SOLUTION SUBCUTANEOUS WEEKLY
Qty: 2 ML | Refills: 1 | Status: SHIPPED | OUTPATIENT
Start: 2025-07-23

## 2025-08-15 DIAGNOSIS — R10.13 DYSPEPSIA: ICD-10-CM

## 2025-08-15 DIAGNOSIS — E11.9 TYPE 2 DIABETES MELLITUS WITHOUT COMPLICATION, WITH LONG-TERM CURRENT USE OF INSULIN (HCC): ICD-10-CM

## 2025-08-15 DIAGNOSIS — Z79.4 TYPE 2 DIABETES MELLITUS WITHOUT COMPLICATION, WITH LONG-TERM CURRENT USE OF INSULIN (HCC): ICD-10-CM

## 2025-08-15 DIAGNOSIS — I15.9 SECONDARY HYPERTENSION: ICD-10-CM

## 2025-08-18 RX ORDER — LANSOPRAZOLE 30 MG/1
30 CAPSULE, DELAYED RELEASE ORAL DAILY
Qty: 30 CAPSULE | Refills: 0 | Status: SHIPPED | OUTPATIENT
Start: 2025-08-18

## 2025-08-18 RX ORDER — RAMIPRIL 10 MG/1
10 CAPSULE ORAL DAILY
Qty: 30 CAPSULE | Refills: 0 | Status: SHIPPED | OUTPATIENT
Start: 2025-08-18

## 2025-08-19 RX ORDER — METFORMIN HYDROCHLORIDE 500 MG/1
TABLET, EXTENDED RELEASE ORAL
Qty: 270 TABLET | Refills: 1 | Status: SHIPPED | OUTPATIENT
Start: 2025-08-19